# Patient Record
Sex: MALE | Race: BLACK OR AFRICAN AMERICAN | NOT HISPANIC OR LATINO | Employment: FULL TIME | URBAN - METROPOLITAN AREA
[De-identification: names, ages, dates, MRNs, and addresses within clinical notes are randomized per-mention and may not be internally consistent; named-entity substitution may affect disease eponyms.]

---

## 2019-03-11 ENCOUNTER — TRANSFERRED RECORDS (OUTPATIENT)
Dept: HEALTH INFORMATION MANAGEMENT | Facility: CLINIC | Age: 64
End: 2019-03-11

## 2019-04-17 NOTE — TELEPHONE ENCOUNTER
RECORDS RECEIVED FROM: Care Everywhere   DATE RECEIVED:4.22.19   NOTES STATUS DETAILS   OFFICE NOTE from referring provider  N/A    OFFICE NOTE from other cardiologist  Care Everywhere 3.28.19, 3.21.19   DISCHARGE SUMMARY from hospital  Care Everywhere 3.10.19   DISCHARGE REPORT from the ER Care Everywhere 3.23.19, 4.16.19   OPERATIVE REPORT  N/A    MEDICATION LIST Care Everywhere    LABS     BMP Care Everywhere 4.16.19, 4.3.19, 3.11.19, 3.10.19, 2.28.19, 1.4.19   CBC     Care Everywhere 4.16.19, 3.11.19, 3.10.19   CMP N/A    TSH     N/A    Lipids N/A    DIAGNOSTIC PROCEDURES     EKG Care Everywhere 4.16.19, 4.23.19, 10.18.18   Monitor Reports Care Everywhere     N/A    IMAGING (DISC & REPORT)      ECHO  Care Everywhere 3.10.19   Stress Tests Care Everywhere    Cath Care Everywhere    MRI/MRA Care Everywhere    CT/CTA Care Everywhere     Care Everywhere      Action    Action Taken 4/18/19 Sent fax to [facility] for echo on 3/11/19

## 2019-04-22 ENCOUNTER — PRE VISIT (OUTPATIENT)
Dept: CARDIOLOGY | Facility: CLINIC | Age: 64
End: 2019-04-22

## 2019-04-22 ENCOUNTER — OFFICE VISIT (OUTPATIENT)
Dept: CARDIOLOGY | Facility: CLINIC | Age: 64
End: 2019-04-22
Attending: INTERNAL MEDICINE
Payer: COMMERCIAL

## 2019-04-22 VITALS
WEIGHT: 170 LBS | HEART RATE: 64 BPM | OXYGEN SATURATION: 99 % | SYSTOLIC BLOOD PRESSURE: 159 MMHG | BODY MASS INDEX: 24.34 KG/M2 | DIASTOLIC BLOOD PRESSURE: 88 MMHG | HEIGHT: 70 IN

## 2019-04-22 DIAGNOSIS — I21.4 NSTEMI (NON-ST ELEVATED MYOCARDIAL INFARCTION) (H): ICD-10-CM

## 2019-04-22 DIAGNOSIS — R00.2 PALPITATIONS: Primary | ICD-10-CM

## 2019-04-22 PROCEDURE — 99203 OFFICE O/P NEW LOW 30 MIN: CPT | Mod: ZP | Performed by: INTERNAL MEDICINE

## 2019-04-22 PROCEDURE — G0463 HOSPITAL OUTPT CLINIC VISIT: HCPCS

## 2019-04-22 PROCEDURE — 93010 ELECTROCARDIOGRAM REPORT: CPT | Mod: ZP | Performed by: INTERNAL MEDICINE

## 2019-04-22 PROCEDURE — 93005 ELECTROCARDIOGRAM TRACING: CPT | Mod: ZF

## 2019-04-22 RX ORDER — AMLODIPINE BESYLATE 10 MG/1
10 TABLET ORAL AT BEDTIME
Qty: 90 TABLET | Refills: 1 | Status: SHIPPED | OUTPATIENT
Start: 2019-04-22 | End: 2019-10-30

## 2019-04-22 RX ORDER — NEOMYCIN SULFATE, POLYMYXIN B SULFATE, AND DEXAMETHASONE 3.5; 10000; 1 MG/G; [USP'U]/G; MG/G
0.5 OINTMENT OPHTHALMIC
COMMUNITY
Start: 2019-04-17 | End: 2022-03-09

## 2019-04-22 RX ORDER — LISINOPRIL 40 MG/1
40 TABLET ORAL DAILY
Qty: 90 TABLET | Refills: 1 | Status: SHIPPED | OUTPATIENT
Start: 2019-04-22 | End: 2019-10-30

## 2019-04-22 RX ORDER — LISINOPRIL 20 MG/1
40 TABLET ORAL
COMMUNITY
Start: 2019-04-18 | End: 2019-04-22 | Stop reason: DRUGHIGH

## 2019-04-22 RX ORDER — BRIMONIDINE TARTRATE AND TIMOLOL MALEATE 2; 5 MG/ML; MG/ML
1 SOLUTION OPHTHALMIC 2 TIMES DAILY
COMMUNITY
Start: 2019-04-12 | End: 2022-06-28

## 2019-04-22 RX ORDER — CHLORHEXIDINE GLUCONATE ORAL RINSE 1.2 MG/ML
15 SOLUTION DENTAL
COMMUNITY
Start: 2019-01-15

## 2019-04-22 RX ORDER — POTASSIUM CHLORIDE 1500 MG/1
20 TABLET, EXTENDED RELEASE ORAL
COMMUNITY
Start: 2019-04-04 | End: 2020-08-06

## 2019-04-22 RX ORDER — METHAZOLAMIDE 50 MG/1
50 TABLET ORAL
COMMUNITY
Start: 2019-03-27 | End: 2022-03-09

## 2019-04-22 RX ORDER — CLOPIDOGREL BISULFATE 75 MG/1
TABLET ORAL
Refills: 3 | COMMUNITY
Start: 2019-04-02 | End: 2022-03-09

## 2019-04-22 RX ORDER — LATANOPROST 50 UG/ML
1 SOLUTION/ DROPS OPHTHALMIC AT BEDTIME
COMMUNITY
Start: 2019-01-04

## 2019-04-22 RX ORDER — ONDANSETRON 4 MG/1
TABLET, ORALLY DISINTEGRATING ORAL
Refills: 0 | COMMUNITY
Start: 2018-11-20 | End: 2022-03-09

## 2019-04-22 RX ORDER — TADALAFIL 2.5 MG/1
TABLET ORAL
COMMUNITY
Start: 2018-11-27 | End: 2022-03-09

## 2019-04-22 RX ORDER — OFLOXACIN 3 MG/ML
1 SOLUTION/ DROPS OPHTHALMIC
COMMUNITY
Start: 2019-04-17 | End: 2019-05-01

## 2019-04-22 RX ORDER — ASPIRIN 81 MG/1
81 TABLET, CHEWABLE ORAL
COMMUNITY
Start: 2019-03-11

## 2019-04-22 RX ORDER — AMLODIPINE BESYLATE 5 MG/1
TABLET ORAL
Refills: 3 | COMMUNITY
Start: 2019-04-19 | End: 2019-04-22 | Stop reason: DRUGHIGH

## 2019-04-22 RX ORDER — TERAZOSIN 5 MG/1
5 CAPSULE ORAL
COMMUNITY
Start: 2019-04-10 | End: 2019-04-22 | Stop reason: ALTCHOICE

## 2019-04-22 RX ORDER — BRIMONIDINE TARTRATE 2 MG/ML
SOLUTION/ DROPS OPHTHALMIC
Refills: 1 | COMMUNITY
Start: 2018-07-30 | End: 2022-03-09 | Stop reason: ALTCHOICE

## 2019-04-22 RX ORDER — PREDNISOLONE ACETATE 10 MG/ML
1 SUSPENSION/ DROPS OPHTHALMIC
COMMUNITY
Start: 2019-04-17 | End: 2022-03-09

## 2019-04-22 RX ORDER — IBUPROFEN 50 MG/1.25
SUSPENSION, DROPS(FINAL DOSAGE FORM)(ML) ORAL
Refills: 0 | COMMUNITY
Start: 2018-11-02

## 2019-04-22 RX ORDER — ATORVASTATIN CALCIUM 40 MG/1
40 TABLET, FILM COATED ORAL
COMMUNITY
Start: 2019-03-21 | End: 2023-04-13

## 2019-04-22 SDOH — HEALTH STABILITY: MENTAL HEALTH: HOW OFTEN DO YOU HAVE A DRINK CONTAINING ALCOHOL?: NEVER

## 2019-04-22 ASSESSMENT — PAIN SCALES - GENERAL: PAINLEVEL: MILD PAIN (3)

## 2019-04-22 ASSESSMENT — MIFFLIN-ST. JEOR: SCORE: 1567.36

## 2019-04-22 NOTE — NURSING NOTE
Chief Complaint   Patient presents with     New Patient     NSTEMI on March 10th due to 100% occlusion of an OM along with 80% mid to distal RCA disease, HTN     Medications reviewed and vitals/ EKG performed.  Tiana Mario CMA

## 2019-04-22 NOTE — PATIENT INSTRUCTIONS
Patient Instructions:  It was a pleasure to see you in the cardiology clinic today.      If you have any questions, you can reach my nurse, Estela Cruz LPN, at [phone].  Press Option #1 for the [facility], and then press Option #3 for nursing.    We are encouraging the use of [employer] to communicate with your HealthCare Provider    Medication Changes:   - Stop Terazosin  - Increase Amlodipine to 10 mg at bedtime.  - Increase Lisinopril to 40 mg every day.    Recommendations: Complete fasting labs.    Studies Ordered: None.    The results from today include: EKG.    Please follow up: With Dr. Fernandes in 3 months.    Sincerely,    Duncan Fernandes MD     If you have an urgent need after hours (8:00 am to 4:30 pm) please call [phone] and ask for the cardiology fellow on call.    [employer]  [email or web address]  Access Code: [email or web address]

## 2019-04-22 NOTE — LETTER
4/22/2019      RE: Delonte Tijerina  [address]  [facility] [address]       Dear Colleague,    Thank you for the opportunity to participate in the care of your patient, Delonte Tijerina, at the [facility] HEART [facility]. Please see a copy of my visit note below.    HPI:     I had the privilege to evaluate and examine Ms. Delonte Tijerina who is a 63-year old male patient with following Hx of a NSTEMI,  CCS class IV, who underwent a coronary angiogram in [facility] 3/11/19 and consequently a coronary thrombectomy, and PCI + stenting - for details - see procedures.  Patient is now in cardiac rehab and the aim of his patient visit today is arteria hypertension - which he has several years and is difficult to control.    Patient denies chest pain, shortness of breath, palpitations and intermittent claudication.      Primary open angle glaucoma of left eye, moderate stage 04/17/2019   Overview:     Added automatically from request for surgery [id number]     Primary open angle glaucoma of right eye, mild stage 04/17/2019   Overview:     Added automatically from request for surgery [id number]     Coronary artery disease involving native coronary artery of native heart without angina pectoris 03/21/2019   Pure hypercholesterolemia 03/11/2019   Sinus bradycardia 03/11/2019   NSTEMI (non-ST elevated myocardial infarction) 03/10/2019   Exertional angina 03/10/2019   Other insomnia 03/10/2019   CKD (chronic kidney disease) stage 3, GFR 30-59 ml/min 03/10/2019   Primary open-angle glaucoma, left eye, moderate stage 10/30/2018   Overview:     Added automatically from request for surgery [id number]     Primary open angle glaucoma 06/23/2014   Overview:     Primary open-angle glaucoma(365.11)     Erectile dysfunction of organic origin 02/16/2012   Essential hypertension 02/18/2008   Overview:     Hypertension     Benign neoplasm of colon 12/04/2007   Overview:     LW Modifier:  repeat colonoscopy 11/2012  LW Onset: [id number]  ; Polyp Colon Adenomatous           PAST MEDICAL HISTORY:  See above summary under HPI    CURRENT MEDICATIONS:  Current Outpatient Medications   Medication Sig Dispense Refill     amLODIPine (NORVASC) 10 MG tablet Take 1 tablet (10 mg) by mouth At Bedtime 90 tablet 1     aspirin (ASA) 81 MG chewable tablet Take 81 mg by mouth       atorvastatin (LIPITOR) 40 MG tablet Take 40 mg by mouth       brimonidine (ALPHAGAN) 0.2 % ophthalmic solution   1     brimonidine-timolol (COMBIGAN) 0.2-0.5 % ophthalmic solution 1 drop       chlorhexidine (PERIDEX) 0.12 % solution 15 mLs       clopidogrel (PLAVIX) 75 MG tablet   3     diclofenac (VOLTAREN) 1 % topical gel Place 2 g onto the skin       EQL SENNA LAXATIVE 8.6 MG tablet   0     latanoprost (XALATAN) 0.005 % ophthalmic solution 1 drop       lisinopril (PRINIVIL/ZESTRIL) 40 MG tablet Take 1 tablet (40 mg) by mouth daily 90 tablet 1     methazolamide (NEPTAZANE) 50 MG tablet Take 50 mg by mouth       neomycin-polymyxin-dexamethasone (MAXITROL) 3.5-41133-6.1 ophthalmic ointment 0.5 inches       ofloxacin (OCUFLOX) 0.3 % ophthalmic solution 1 drop       ondansetron (ZOFRAN-ODT) 4 MG ODT tab   0     potassium chloride ER (K-DUR/KLOR-CON M) 20 MEQ CR tablet Take 20 mEq by mouth       prednisoLONE acetate (PRED FORTE) 1 % ophthalmic suspension 1 drop every 2 hours       tadalafil (CIALIS) 2.5 MG tablet TAKE 1 TABLET DAILY         PAST SURGICAL HISTORY:  See under HPI  ALLERGIES     No Known Allergies    FAMILY HISTORY:  ACHILLES TENDON SURGERY         TONSILLECTOMY         SHOULDER SURGERY         SHOULDER SURGERY 11/02/2018 Left subacromail decompression, distal clavicle excision       SOCIAL HISTORY:  Social History     Socioeconomic History     Marital status:      Spouse name: None     Number of children: None     Years of education: None     Highest education level: None   Occupational History     None   Social Needs      "Financial resource strain: None     Food insecurity:     Worry: None     Inability: None     Transportation needs:     Medical: None     Non-medical: None   Tobacco Use     Smoking status: Never Smoker     Smokeless tobacco: Never Used   Substance and Sexual Activity     Alcohol use: Never     Frequency: Never     Drug use: Never     Sexual activity: None   Lifestyle     Physical activity:     Days per week: None     Minutes per session: None     Stress: None   Relationships     Social connections:     Talks on phone: None     Gets together: None     Attends Spiritism service: None     Active member of club or organization: None     Attends meetings of clubs or organizations: None     Relationship status: None     Intimate partner violence:     Fear of current or ex partner: None     Emotionally abused: None     Physically abused: None     Forced sexual activity: None   Other Topics Concern     None   Social History Narrative     None       ROS:   Constitutional: No fever, chills, or sweats. No weight gain/loss   ENT: No visual disturbance, ear ache, epistaxis, sore throat  Allergies/Immunologic: Negative.   Respiratory: No cough, hemoptysia  Cardiovascular: As per HPI  GI: No nausea, vomiting, hematemesis, melena, or hematochezia  : No urinary frequency, dysuria, or hematuria  Integument: Negative  Psychiatric: Negative  Neuro: Negative  Endocrinology: Negative   Musculoskeletal: Negative    EXAM:  /88 (BP Location: Right arm, Patient Position: Chair, Cuff Size: Adult Regular)   Pulse 64   Ht 1.778 m (5' 10\")   Wt 77.1 kg (170 lb)   SpO2 99%   BMI 24.39 kg/m     In general, the patient is a pleasant male in no apparent distress.    HEENT: NC/AT.  PERRLA.  EOMI.  Sclerae white, not injected.  Nares clear.  Pharynx without erythema or exudate.  Dentition intact.    Neck: No adenopathy.  No thyromegaly. Carotids +4/4 bilaterally without bruits.  No jugular venous distension.   Heart: RRR. Normal S1, S2 " splits physiologically. No murmur, rub, click, or gallop. The PMI is in the 5th ICS in the midclavicular line. There is no heave.    Lungs: CTA.  No ronchi, wheezes, rales.  No dullness to percussion.   Abdomen: Soft, nontender, nondistended. No organomegaly.  No bruits.   Extremities: No clubbing, cyanosis, or edema.  The pulses are +4/4 at the radial, brachial, femoral, popliteal, DP, and PT sites bilaterally.  No bruits are noted.  Neurologic: Alert and oriented to person/place/time, normal speech, gait and affect  Skin: No petechiae, purpura or rash.    Labs:    Lab results from [facility] from  last admission in March 2019 were reviewed with patient (see Care everywhere)     Procedures:  EKG 4/22/19: sin bradycardia (59bpm) - inverted T-wave V3-V6          Cardiovascular Catheterization Comprehensive Report      03/11/2019      History and indications    INDICATIONS   --  Angina/MI: myocardial infarction without    ST elevation (NSTEMI), CCS class IV. The patient was    stable.        Procedures    PROCEDURES PERFORMED:        --  Right coronary angiography.    --  Left coronary angiography.    --  Coronary Thrombectomy.    --  Cor. Stent, Single Vessel.    --  Cor. Stent, Additional Ves.    --  Intervention on OM1: drug-eluting stent.    --  Intervention on mid RCA: drug-eluting stent.    NARRATIVE: The risks and alternatives of the procedures and    conscious sedation were explained to the patient and    informed consent was obtained. The patient was brought to    the cath lab and placed on the table. The planned puncture    sites were prepped and draped in the usual sterile fashion.        --  Right radial artery access.        --  Right coronary artery angiography. The vessel was    selectively cannulated and angiography was performed.        --  Left coronary artery angiography. The vessel was    selectively cannulated and angiography was performed.        Coronary angiography        CORONARY  CIRCULATION: The coronary circulation is right    dominant.    Left main coronary artery    Left main: Normal.    Left anterior descending artery and branches    LAD: Angiography showed minor luminal irregularities. Mid    LAD: There was a discrete 20 % stenosis at a site with no    prior intervention.    Left circumflex artery and branches    Circumflex: Angiography showed minor luminal irregularities.    1st obtuse marginal: There was a tubular 100 % stenosis at    a site with no prior intervention. The lesion was    associated with a large filling defect consistent with    thrombus. There was EDILSON grade 0 flow through the vessel    (no flow). An intervention was performed.    Right coronary artery and branches    Proximal RCA: There was a tubular 40 % stenosis at a site    with no prior intervention. Mid RCA: Distal RCA: There was    a discrete 80 % stenosis at a site with no prior    intervention. The lesion was without evidence of thrombus.    An intervention was performed.        Interventions        LESION #1 INTERVENTION    A drug-eluting stent was performed on the lesion in the 1st    obtuse marginal. There was no dissection.        --  Vessel setup was performed. A CLS3 guiding catheter was    used to intubate the vessel.        --  Vessel setup was performed. A [employer] RC wire was used    to cross the lesion.        --  Mechanical ( [employer] AC) thrombectomy was    performed, with max duration 24 sec, max volume out 10 ml,    and 1 attempt(s).        --  Balloon angioplasty was performed, using a 2.75 x 12 mm    Emerge MR balloon, with 1 inflations and a maximum    inflation pressure of 12 alberto.        --  A 3.0 x 24mm Synergy everolimus-eluting stent was placed    across the lesion and deployed at a maximum inflation    pressure of 11 alberto.        --  Balloon angioplasty was performed, using a 3.25 x 12 mm    NC Emerge balloon, with 2 inflations and a maximum    inflation pressure of 14 alberto.         LESION #2 INTERVENTION A drug-eluting stent was performed on    the lesion in the mid RCA. There was no dissection.        A 4.0 x 20mm Synergy everolimus-eluting stent was placed    across the lesion and deployed at a maximum inflation    pressure of 11 alberto.        Balloon angioplasty was performed, using a 4.5 x 12 mm NC    Emerge balloon, with 2 inflations and a maximum inflation    pressure of 12 alberto.        CARDIAC INTERVENTIONS    Coronary Thrombectomy.        Cor. Stent, Single Vessel.        Cor. Stent, Additional Ves.          Assessment and Plan:     We discussed the results with the patient.  The discussed extensively the importance of a heart healthy diet, lifestyle as well as salt reduction in the food.   We advised to continue with cardiac rehab.  We discussed the lab results during hospitalization and follow-up in the [facility] for his NSTEMI March 11 this year.  We made following medication changes regarding his antihypertensive medication:     - Stop Terazosin  - Increase Amlodipine to 10 mg at bedtime.  - Increase Lisinopril to 40 mg every day.     Recommendations: Complete fasting labs.     The results from today include: EKG.    We asked patient also register his BP measured during cardiac rehab and later start with home BP measurements.     Please follow up: With Dr. Escudero in 3 months.    Kae Escudero MD, PhD  Professor of Medicine  Division of Cardiology             CC  Patient Care Team:  Abran Tapia MD as PCP - General (Family Practice)  KAE ESCUDERO    Please do not hesitate to contact me if you have any questions/concerns.     Sincerely,     Kae Escudero MD

## 2019-04-23 LAB — INTERPRETATION ECG - MUSE: NORMAL

## 2019-04-30 NOTE — PROGRESS NOTES
HPI:     I had the privilege to evaluate and examine Ms. Delonte Tijerina who is a 63-year old male patient with following Hx of a NSTEMI,  CCS class IV, who underwent a coronary angiogram in [facility] 3/11/19 and consequently a coronary thrombectomy, and PCI + stenting - for details - see procedures.  Patient is now in cardiac rehab and the aim of his patient visit today is arteria hypertension - which he has several years and is difficult to control.    Patient denies chest pain, shortness of breath, palpitations and intermittent claudication.      Primary open angle glaucoma of left eye, moderate stage 04/17/2019   Overview:     Added automatically from request for surgery [id number]     Primary open angle glaucoma of right eye, mild stage 04/17/2019   Overview:     Added automatically from request for surgery [id number]     Coronary artery disease involving native coronary artery of native heart without angina pectoris 03/21/2019   Pure hypercholesterolemia 03/11/2019   Sinus bradycardia 03/11/2019   NSTEMI (non-ST elevated myocardial infarction) 03/10/2019   Exertional angina 03/10/2019   Other insomnia 03/10/2019   CKD (chronic kidney disease) stage 3, GFR 30-59 ml/min 03/10/2019   Primary open-angle glaucoma, left eye, moderate stage 10/30/2018   Overview:     Added automatically from request for surgery [id number]     Primary open angle glaucoma 06/23/2014   Overview:     Primary open-angle glaucoma(365.11)     Erectile dysfunction of organic origin 02/16/2012   Essential hypertension 02/18/2008   Overview:     Hypertension     Benign neoplasm of colon 12/04/2007   Overview:     LW Modifier: repeat colonoscopy 11/2012  LW Onset: 27Nov2007  ; Polyp Colon Adenomatous           PAST MEDICAL HISTORY:  See above summary under HPI    CURRENT MEDICATIONS:  Current Outpatient Medications   Medication Sig Dispense Refill     amLODIPine (NORVASC) 10 MG tablet Take 1 tablet (10 mg) by mouth At Bedtime 90 tablet 1      aspirin (ASA) 81 MG chewable tablet Take 81 mg by mouth       atorvastatin (LIPITOR) 40 MG tablet Take 40 mg by mouth       brimonidine (ALPHAGAN) 0.2 % ophthalmic solution   1     brimonidine-timolol (COMBIGAN) 0.2-0.5 % ophthalmic solution 1 drop       chlorhexidine (PERIDEX) 0.12 % solution 15 mLs       clopidogrel (PLAVIX) 75 MG tablet   3     diclofenac (VOLTAREN) 1 % topical gel Place 2 g onto the skin       EQL SENNA LAXATIVE 8.6 MG tablet   0     latanoprost (XALATAN) 0.005 % ophthalmic solution 1 drop       lisinopril (PRINIVIL/ZESTRIL) 40 MG tablet Take 1 tablet (40 mg) by mouth daily 90 tablet 1     methazolamide (NEPTAZANE) 50 MG tablet Take 50 mg by mouth       neomycin-polymyxin-dexamethasone (MAXITROL) 3.5-50037-0.1 ophthalmic ointment 0.5 inches       ofloxacin (OCUFLOX) 0.3 % ophthalmic solution 1 drop       ondansetron (ZOFRAN-ODT) 4 MG ODT tab   0     potassium chloride ER (K-DUR/KLOR-CON M) 20 MEQ CR tablet Take 20 mEq by mouth       prednisoLONE acetate (PRED FORTE) 1 % ophthalmic suspension 1 drop every 2 hours       tadalafil (CIALIS) 2.5 MG tablet TAKE 1 TABLET DAILY         PAST SURGICAL HISTORY:  See under HPI  ALLERGIES     No Known Allergies    FAMILY HISTORY:  ACHILLES TENDON SURGERY         TONSILLECTOMY         SHOULDER SURGERY         SHOULDER SURGERY 11/02/2018 Left subacromail decompression, distal clavicle excision       SOCIAL HISTORY:  Social History     Socioeconomic History     Marital status:      Spouse name: None     Number of children: None     Years of education: None     Highest education level: None   Occupational History     None   Social Needs     Financial resource strain: None     Food insecurity:     Worry: None     Inability: None     Transportation needs:     Medical: None     Non-medical: None   Tobacco Use     Smoking status: Never Smoker     Smokeless tobacco: Never Used   Substance and Sexual Activity     Alcohol use: Never     Frequency: Never     Drug  "use: Never     Sexual activity: None   Lifestyle     Physical activity:     Days per week: None     Minutes per session: None     Stress: None   Relationships     Social connections:     Talks on phone: None     Gets together: None     Attends Nondenominational service: None     Active member of club or organization: None     Attends meetings of clubs or organizations: None     Relationship status: None     Intimate partner violence:     Fear of current or ex partner: None     Emotionally abused: None     Physically abused: None     Forced sexual activity: None   Other Topics Concern     None   Social History Narrative     None       ROS:   Constitutional: No fever, chills, or sweats. No weight gain/loss   ENT: No visual disturbance, ear ache, epistaxis, sore throat  Allergies/Immunologic: Negative.   Respiratory: No cough, hemoptysia  Cardiovascular: As per HPI  GI: No nausea, vomiting, hematemesis, melena, or hematochezia  : No urinary frequency, dysuria, or hematuria  Integument: Negative  Psychiatric: Negative  Neuro: Negative  Endocrinology: Negative   Musculoskeletal: Negative    EXAM:  /88 (BP Location: Right arm, Patient Position: Chair, Cuff Size: Adult Regular)   Pulse 64   Ht 1.778 m (5' 10\")   Wt 77.1 kg (170 lb)   SpO2 99%   BMI 24.39 kg/m    In general, the patient is a pleasant male in no apparent distress.    HEENT: NC/AT.  PERRLA.  EOMI.  Sclerae white, not injected.  Nares clear.  Pharynx without erythema or exudate.  Dentition intact.    Neck: No adenopathy.  No thyromegaly. Carotids +4/4 bilaterally without bruits.  No jugular venous distension.   Heart: RRR. Normal S1, S2 splits physiologically. No murmur, rub, click, or gallop. The PMI is in the 5th ICS in the midclavicular line. There is no heave.    Lungs: CTA.  No ronchi, wheezes, rales.  No dullness to percussion.   Abdomen: Soft, nontender, nondistended. No organomegaly.  No bruits.   Extremities: No clubbing, cyanosis, or edema.  The " pulses are +4/4 at the radial, brachial, femoral, popliteal, DP, and PT sites bilaterally.  No bruits are noted.  Neurologic: Alert and oriented to person/place/time, normal speech, gait and affect  Skin: No petechiae, purpura or rash.    Labs:    Lab results from [facility] from  last admission in March 2019 were reviewed with patient (see Care everywhere)     Procedures:  EKG 4/22/19: sin bradycardia (59bpm) - inverted T-wave V3-V6          Cardiovascular Catheterization Comprehensive Report      03/11/2019      History and indications    INDICATIONS   --  Angina/MI: myocardial infarction without    ST elevation (NSTEMI), CCS class IV. The patient was    stable.        Procedures    PROCEDURES PERFORMED:        --  Right coronary angiography.    --  Left coronary angiography.    --  Coronary Thrombectomy.    --  Cor. Stent, Single Vessel.    --  Cor. Stent, Additional Ves.    --  Intervention on OM1: drug-eluting stent.    --  Intervention on mid RCA: drug-eluting stent.    NARRATIVE: The risks and alternatives of the procedures and    conscious sedation were explained to the patient and    informed consent was obtained. The patient was brought to    the cath lab and placed on the table. The planned puncture    sites were prepped and draped in the usual sterile fashion.        --  Right radial artery access.        --  Right coronary artery angiography. The vessel was    selectively cannulated and angiography was performed.        --  Left coronary artery angiography. The vessel was    selectively cannulated and angiography was performed.        Coronary angiography        CORONARY CIRCULATION: The coronary circulation is right    dominant.    Left main coronary artery    Left main: Normal.    Left anterior descending artery and branches    LAD: Angiography showed minor luminal irregularities. Mid    LAD: There was a discrete 20 % stenosis at a site with no    prior intervention.    Left circumflex artery  and branches    Circumflex: Angiography showed minor luminal irregularities.    1st obtuse marginal: There was a tubular 100 % stenosis at    a site with no prior intervention. The lesion was    associated with a large filling defect consistent with    thrombus. There was EDILSON grade 0 flow through the vessel    (no flow). An intervention was performed.    Right coronary artery and branches    Proximal RCA: There was a tubular 40 % stenosis at a site    with no prior intervention. Mid RCA: Distal RCA: There was    a discrete 80 % stenosis at a site with no prior    intervention. The lesion was without evidence of thrombus.    An intervention was performed.        Interventions        LESION #1 INTERVENTION    A drug-eluting stent was performed on the lesion in the 1st    obtuse marginal. There was no dissection.        --  Vessel setup was performed. A CLS3 guiding catheter was    used to intubate the vessel.        --  Vessel setup was performed. A AddMyBest RC wire was used    to cross the lesion.        --  Mechanical ( Priority One AC) thrombectomy was    performed, with max duration 24 sec, max volume out 10 ml,    and 1 attempt(s).        --  Balloon angioplasty was performed, using a 2.75 x 12 mm    Emerge MR balloon, with 1 inflations and a maximum    inflation pressure of 12 alberto.        --  A 3.0 x 24mm Synergy everolimus-eluting stent was placed    across the lesion and deployed at a maximum inflation    pressure of 11 alberto.        --  Balloon angioplasty was performed, using a 3.25 x 12 mm    NC Emerge balloon, with 2 inflations and a maximum    inflation pressure of 14 alberto.        LESION #2 INTERVENTION A drug-eluting stent was performed on    the lesion in the mid RCA. There was no dissection.        A 4.0 x 20mm Synergy everolimus-eluting stent was placed    across the lesion and deployed at a maximum inflation    pressure of 11 alberto.        Balloon angioplasty was performed, using a 4.5 x 12 mm NC     Emerge balloon, with 2 inflations and a maximum inflation    pressure of 12 alberto.        CARDIAC INTERVENTIONS    Coronary Thrombectomy.        Cor. Stent, Single Vessel.        Cor. Stent, Additional Ves.          Assessment and Plan:     We discussed the results with the patient.  The discussed extensively the importance of a heart healthy diet, lifestyle as well as salt reduction in the food.   We advised to continue with cardiac rehab.  We discussed the lab results during hospitalization and [facility] for his NSTEMI March 11 this year.  We made following medication changes regarding his antihypertensive medication:     - Stop Terazosin  - Increase Amlodipine to 10 mg at bedtime.  - Increase Lisinopril to 40 mg every day.     Recommendations: Complete fasting labs.     The results from today include: EKG.    We asked patient also register his BP measured during cardiac rehab and later start with home BP measurements.     Please follow up: With Dr. Escudero in 3 months.    Kae Escudero MD, PhD  Professor of Medicine  Division of Cardiology             CC  Patient Care Team:  Abran Tapia MD as PCP - General (Family Practice)  KAE ESCUDERO

## 2019-09-30 ENCOUNTER — HEALTH MAINTENANCE LETTER (OUTPATIENT)
Age: 64
End: 2019-09-30

## 2019-10-30 DIAGNOSIS — I21.4 NSTEMI (NON-ST ELEVATED MYOCARDIAL INFARCTION) (H): ICD-10-CM

## 2019-11-01 RX ORDER — LISINOPRIL 40 MG/1
40 TABLET ORAL DAILY
Qty: 90 TABLET | Refills: 0 | Status: SHIPPED | OUTPATIENT
Start: 2019-11-01 | End: 2020-01-20

## 2019-11-01 RX ORDER — AMLODIPINE BESYLATE 10 MG/1
10 TABLET ORAL AT BEDTIME
Qty: 90 TABLET | Refills: 0 | Status: SHIPPED | OUTPATIENT
Start: 2019-11-01 | End: 2020-01-20

## 2020-01-19 DIAGNOSIS — I21.4 NSTEMI (NON-ST ELEVATED MYOCARDIAL INFARCTION) (H): ICD-10-CM

## 2020-01-20 NOTE — TELEPHONE ENCOUNTER
Lisinopril Oral Tablet 40 MG     Last Written Prescription Date:  11/1/2019  Last Fill Quantity: 90,   # refills: 0  Last Office Visit : 4/22/2019  Future Office visit:  None  Routing refill request to provider for review/approval because:  Follow up appointment??  On 4/22/2019 Pt due for follow up visit for Pt care.  Continue same dose?? Change dose??   Change Med??     Referring to Provider for review      amLODIPine Besylate Oral Tablet 10 MG    Last Written Prescription Date:  11/1/2019  Last Fill Quantity: 90,   # refills: 0  Last Office Visit : 4/22/2019  Future Office visit:  None  Routing refill request to provider for review/approval because:  Follow up appointment??    On 4/22/2019 Pt due for follow up visit for Pt care.  Continue same dose?? Change dose??   Change Med??     Referring to Provider for review  Instructions     Patient Instructions:  It was a pleasure to see you in the cardiology clinic today.        If you have any questions, you can reach my nurse, Estela Cruz LPN, at [phone].  Press Option #1 for the [facility], and then press Option #3 for nursing.     We are encouraging the use of [employer] to communicate with your HealthCare Provider     Medication Changes:   - Stop Terazosin  - Increase Amlodipine to 10 mg at bedtime.  - Increase Lisinopril to 40 mg every day.     Recommendations: Complete fasting labs.     Studies Ordered: None.     The results from today include: EKG.     Please follow up: With Dr. Fernandes in 3 months.     Sincerely,     Duncan Fernandes MD 4/22/2019     If you have an urgent need after hours (8:00 am to 4:30 pm) please call [phone] and ask for the cardiology fellow on call.

## 2020-01-24 RX ORDER — LISINOPRIL 40 MG/1
40 TABLET ORAL DAILY
Qty: 30 TABLET | Refills: 0 | Status: SHIPPED | OUTPATIENT
Start: 2020-01-24 | End: 2020-03-01

## 2020-01-24 RX ORDER — AMLODIPINE BESYLATE 10 MG/1
10 TABLET ORAL AT BEDTIME
Qty: 30 TABLET | Refills: 0 | Status: SHIPPED | OUTPATIENT
Start: 2020-01-24 | End: 2020-03-03

## 2020-02-16 DIAGNOSIS — I21.4 NSTEMI (NON-ST ELEVATED MYOCARDIAL INFARCTION) (H): ICD-10-CM

## 2020-02-18 NOTE — TELEPHONE ENCOUNTER
amLODIPine Besylate Oral Tablet 10 MG     Last Written Prescription Date:  1/24/20  Last Fill Quantity: 30,   # refills: 0       Lisinopril Oral Tablet 40 MG  Last Written Prescription Date:  1/24/20  Last Fill Quantity: 30,   # refills: 0  Last Office Visit : 4/22/2019  Future Office visit:  None( recommended 3 months)    Routing refill request to provider for review/approval because: NSTEMI at [facility] March 2019.  Seen at [facility] x 1.  Last labs: per Care Everywhere on 4/16/2019,  K=3.9  Cr =1.19 ( abnormal)   No follow up appt.   90 day and 30 day kaiden refills given. Pended x 14 days.    Scheduling has been notified to contact the pt for appointment.

## 2020-02-19 RX ORDER — LISINOPRIL 40 MG/1
40 TABLET ORAL DAILY
Qty: 14 TABLET | Refills: 0 | OUTPATIENT
Start: 2020-02-19

## 2020-02-19 RX ORDER — AMLODIPINE BESYLATE 10 MG/1
10 TABLET ORAL AT BEDTIME
Qty: 14 TABLET | Refills: 0 | OUTPATIENT
Start: 2020-02-19

## 2020-02-19 NOTE — TELEPHONE ENCOUNTER
Refill for Lisinopril and Amlodipine has been refused as Pt is overdue for F/U.  Lisette refill provided.    Estela Cruz LPN

## 2020-02-28 DIAGNOSIS — I21.4 NSTEMI (NON-ST ELEVATED MYOCARDIAL INFARCTION) (H): ICD-10-CM

## 2020-03-01 DIAGNOSIS — I21.4 NSTEMI (NON-ST ELEVATED MYOCARDIAL INFARCTION) (H): ICD-10-CM

## 2020-03-01 NOTE — TELEPHONE ENCOUNTER
"   lisinopril (PRINIVIL/ZESTRIL) 40 MG tablet  Last Written Prescription Date:  1/24/20  Last Fill Quantity:30   # refills: 0  Last Office Visit : 4/22/19  Future Office visit:  None    \" With Dr. Fernandes in 3 months\"    Scheduling has been notified to contact the pt for appointment.      Routing refill request to provider for review/approval because: past due for 3 mth appt. rf or refuse?        "

## 2020-03-03 ENCOUNTER — TELEPHONE (OUTPATIENT)
Dept: CARDIOLOGY | Facility: CLINIC | Age: 65
End: 2020-03-03

## 2020-03-03 DIAGNOSIS — I21.4 NSTEMI (NON-ST ELEVATED MYOCARDIAL INFARCTION) (H): ICD-10-CM

## 2020-03-03 RX ORDER — AMLODIPINE BESYLATE 10 MG/1
10 TABLET ORAL AT BEDTIME
Qty: 30 TABLET | Refills: 0 | Status: SHIPPED | OUTPATIENT
Start: 2020-03-03 | End: 2020-03-05

## 2020-03-03 RX ORDER — LISINOPRIL 40 MG/1
40 TABLET ORAL DAILY
Qty: 90 TABLET | Refills: 0 | Status: SHIPPED | OUTPATIENT
Start: 2020-03-03 | End: 2020-03-05

## 2020-03-03 NOTE — TELEPHONE ENCOUNTER
[facility] Call Center    Phone Message    May a detailed message be left on voicemail: yes     Reason for Call: Medication Refill Request    Has the patient contacted the pharmacy for the refill? Yes   Name of medication being requested: amLODIPine (NORVASC) 10 MG tablet and lisinopril (PRINIVIL/ZESTRIL) 40 MG tablet  Provider who prescribed the medication: Dr. Fernandes  Pharmacy: [facility] [address] [place] [address]  Date medication is needed: ASAP   Pt is scheduled for the first available visit with Dr. Fernandes on 06/04/2020. Pt states he is currently completely out of medication and is needing his refills ASAP.       Action Taken: Message routed to:  [facility] Cardio    Travel Screening: Not Applicable

## 2020-03-03 NOTE — TELEPHONE ENCOUNTER
Last Clinic Visit: 4/22/19   NV:  6/4/20   30 DAY RFs   UNTIL APPT  Card FYI  : BP > 140/90   30 DAY RF OVER DUE RTC

## 2020-03-05 RX ORDER — LISINOPRIL 40 MG/1
40 TABLET ORAL DAILY
Qty: 90 TABLET | Refills: 0 | Status: SHIPPED | OUTPATIENT
Start: 2020-03-05 | End: 2020-08-06

## 2020-03-05 RX ORDER — AMLODIPINE BESYLATE 10 MG/1
10 TABLET ORAL AT BEDTIME
Qty: 90 TABLET | Refills: 0 | Status: SHIPPED | OUTPATIENT
Start: 2020-03-05 | End: 2020-07-03

## 2020-03-15 ENCOUNTER — HEALTH MAINTENANCE LETTER (OUTPATIENT)
Age: 65
End: 2020-03-15

## 2020-04-27 ENCOUNTER — TELEPHONE (OUTPATIENT)
Dept: OPHTHALMOLOGY | Facility: CLINIC | Age: 65
End: 2020-04-27

## 2020-04-28 NOTE — TELEPHONE ENCOUNTER
FUTURE VISIT INFORMATION      FUTURE VISIT INFORMATION:    Date: 6/9/20    Time: 10:00am    [place] [facility]  [place]:    Referring provider:  self    Referring providers clinic:  N/A    Reason for visit/diagnosis   Reconstructive - Droopy Eyelid    RECORDS REQUESTED FROM:       Clinic name Comments Records Status Imaging Status   [facility] OV/notes 10/9/2006-4/19/19 Epic/Care Everywhere

## 2020-05-18 ENCOUNTER — DOCUMENTATION ONLY (OUTPATIENT)
Dept: CARE COORDINATION | Facility: CLINIC | Age: 65
End: 2020-05-18

## 2020-06-09 ENCOUNTER — PRE VISIT (OUTPATIENT)
Dept: OPHTHALMOLOGY | Facility: CLINIC | Age: 65
End: 2020-06-09

## 2020-07-02 DIAGNOSIS — I21.4 NSTEMI (NON-ST ELEVATED MYOCARDIAL INFARCTION) (H): ICD-10-CM

## 2020-07-03 NOTE — TELEPHONE ENCOUNTER
amLODIPine Besylate Oral Tablet 10 MG       Last Written Prescription Date:  3-5-20  Last Fill Quantity: 90,   # refills: 0  Last Office Visit : 4-22-19 ( RTC 3 M)  Future Office visit:  8-6-20    Routing refill request to provider for review/approval because:  Previous kaiden RF given.  Previous cancel appt  ? RF    FYI: overdue lab: Cr

## 2020-07-07 RX ORDER — AMLODIPINE BESYLATE 10 MG/1
10 TABLET ORAL AT BEDTIME
Qty: 90 TABLET | Refills: 0 | Status: SHIPPED | OUTPATIENT
Start: 2020-07-07 | End: 2020-08-06

## 2020-07-07 NOTE — TELEPHONE ENCOUNTER
[facility] Call Center    Phone Message    May a detailed message be left on voicemail: yes     Reason for Call: Other: . pt states he is completely out of this med. It appears we received this request on 6:45am 7/2 which makes this beyond 72 business hour turnaround time.    Action Taken: Message routed to:  [facility] heart    Travel Screening: Not Applicable

## 2020-07-07 NOTE — TELEPHONE ENCOUNTER
Called and left Pt VM informing him amlodipine prescription was sent to his preferred pharmacy.    Estela Cruz LPN

## 2020-08-06 ENCOUNTER — VIRTUAL VISIT (OUTPATIENT)
Dept: CARDIOLOGY | Facility: CLINIC | Age: 65
End: 2020-08-06
Payer: COMMERCIAL

## 2020-08-06 DIAGNOSIS — I21.4 NSTEMI (NON-ST ELEVATED MYOCARDIAL INFARCTION) (H): Primary | ICD-10-CM

## 2020-08-06 DIAGNOSIS — I21.4 NSTEMI (NON-ST ELEVATED MYOCARDIAL INFARCTION) (H): ICD-10-CM

## 2020-08-06 DIAGNOSIS — Z13.220 LIPID SCREENING: ICD-10-CM

## 2020-08-06 LAB
ALBUMIN SERPL-MCNC: 3.6 G/DL (ref 3.4–5)
ALP SERPL-CCNC: 168 U/L (ref 40–150)
ALT SERPL W P-5'-P-CCNC: 43 U/L (ref 0–70)
ANION GAP SERPL CALCULATED.3IONS-SCNC: 4 MMOL/L (ref 3–14)
AST SERPL W P-5'-P-CCNC: 31 U/L (ref 0–45)
BILIRUB SERPL-MCNC: 0.6 MG/DL (ref 0.2–1.3)
BUN SERPL-MCNC: 17 MG/DL (ref 7–30)
CALCIUM SERPL-MCNC: 8.6 MG/DL (ref 8.5–10.1)
CHLORIDE SERPL-SCNC: 106 MMOL/L (ref 94–109)
CHOLEST SERPL-MCNC: 98 MG/DL
CO2 SERPL-SCNC: 32 MMOL/L (ref 20–32)
CREAT SERPL-MCNC: 1.37 MG/DL (ref 0.66–1.25)
GFR SERPL CREATININE-BSD FRML MDRD: 53 ML/MIN/{1.73_M2}
GLUCOSE SERPL-MCNC: 107 MG/DL (ref 70–99)
HDLC SERPL-MCNC: 40 MG/DL
LDLC SERPL CALC-MCNC: 45 MG/DL
NONHDLC SERPL-MCNC: 57 MG/DL
POTASSIUM SERPL-SCNC: 3.4 MMOL/L (ref 3.4–5.3)
PROT SERPL-MCNC: 7.4 G/DL (ref 6.8–8.8)
SODIUM SERPL-SCNC: 142 MMOL/L (ref 133–144)
TRIGL SERPL-MCNC: 61 MG/DL

## 2020-08-06 PROCEDURE — 40001009 ZZH VIDEO/TELEPHONE VISIT; NO CHARGE

## 2020-08-06 PROCEDURE — 99214 OFFICE O/P EST MOD 30 MIN: CPT | Mod: 95 | Performed by: INTERNAL MEDICINE

## 2020-08-06 PROCEDURE — 80061 LIPID PANEL: CPT | Performed by: INTERNAL MEDICINE

## 2020-08-06 PROCEDURE — 80053 COMPREHEN METABOLIC PANEL: CPT | Performed by: INTERNAL MEDICINE

## 2020-08-06 PROCEDURE — 36415 COLL VENOUS BLD VENIPUNCTURE: CPT | Performed by: INTERNAL MEDICINE

## 2020-08-06 RX ORDER — ESCITALOPRAM OXALATE 10 MG/1
10 TABLET ORAL DAILY
COMMUNITY
End: 2022-03-09

## 2020-08-06 RX ORDER — AMLODIPINE BESYLATE 10 MG/1
10 TABLET ORAL AT BEDTIME
Qty: 90 TABLET | Refills: 3 | Status: SHIPPED | OUTPATIENT
Start: 2020-08-06 | End: 2021-08-30

## 2020-08-06 RX ORDER — LISINOPRIL 40 MG/1
40 TABLET ORAL DAILY
Qty: 90 TABLET | Refills: 3 | Status: SHIPPED | OUTPATIENT
Start: 2020-08-06

## 2020-08-06 RX ORDER — CARVEDILOL 6.25 MG/1
6.25 TABLET ORAL 2 TIMES DAILY WITH MEALS
COMMUNITY

## 2020-08-06 NOTE — PROGRESS NOTES
"Deolnte Tijerina is a 64 year old male who is being evaluated via a billable telephone visit.      The patient has been notified of following:     \"This telephone visit will be conducted via a call between you and your physician/provider. We have found that certain health care needs can be provided without the need for a physical exam.  This service lets us provide the care you need with a short phone conversation.  If a prescription is necessary we can send it directly to your pharmacy.  If lab work is needed we can place an order for that and you can then stop by our lab to have the test done at a later time.    Telephone visits are billed at different rates depending on your insurance coverage. During this emergency period, for some insurers they may be billed the same as an in-person visit.  Please reach out to your insurance provider with any questions.    If during the course of the call the physician/provider feels a telephone visit is not appropriate, you will not be charged for this service.\"    Patient has given verbal consent for Telephone visit?  Yes    What phone number would you like to be contacted at? [phone]    How would you [facility] your [facility]? [facility]    HPI:     I had the privilege to evaluate Mr. Delonte Tijerina who is a 63-year old male patient with following Hx of a NSTEMI, CCS class IV, who underwent a coronary angiogram in [facility] 3/11/19 and consequently a coronary thrombectomy, and PCI + stenting - for details - see procedures.   Patient last seen in clinic in April 2019. In the interim, she has undergone 2 stress tests for chest discomfort. She first had a Lexiscan (vasodilator + low level exercise) performed in June 2019 which did not reveal any resting or inducible perfusion deficits suggestive of ischemia. Patient underwent exercise stress echocardiogram on January 7 2020 with [facility] (see Care Everywhere for details), where she exercised 9 min 18 " sec on a standard Jesus protocol (10.1 METS) with mild chest discomfort at peak exercise but not exercise-limiting dyspnea.There was some evidence of inducible inferior wall ischemia, however patient was unable to achieve target heart rate which may reduce sensitivity of this test.     He completed his cardiac rehab and has been doing very well since. He has been exercising more, mowing his lawn and doing chores around the home without symptoms. He frequently plays golf which he enjoys without symptoms. Reports taking all of his medications as directed.    Patient denies chest pain, shortness of breath, palpitations and intermittent claudication.    PAST MEDICAL HISTORY:    Coronary artery disease involving native coronary artery of native heart without angina pectoris 03/21/2019   Pure hypercholesterolemia 03/11/2019   Sinus bradycardia 03/11/2019   NSTEMI (non-ST elevated myocardial infarction) 03/10/2019   Exertional angina 03/10/2019   Other insomnia 03/10/2019   CKD (chronic kidney disease) stage 3, GFR 30-59 ml/min 03/10/2019   Primary open-angle glaucoma, left eye, moderate stage 10/30/2018   Overview:     Added automatically from request for surgery [id number]     Primary open angle glaucoma 06/23/2014   Overview:     Primary open-angle glaucoma(365.11)     Erectile dysfunction of organic origin 02/16/2012   Essential hypertension 02/18/2008   Overview:     Hypertension     Benign neoplasm of colon 12/04/2007   Overview:     LW Modifier: repeat colonoscopy 11/2012  LW Onset: [id number]  ; Polyp Colon Adenomatous            CURRENT MEDICATIONS:  Current Outpatient Medications   Medication Sig Dispense Refill     amLODIPine (NORVASC) 10 MG tablet Take 1 tablet (10 mg) by mouth At Bedtime Please keep 8-6-20 clinic appt for refills 90 tablet 0     aspirin (ASA) 81 MG chewable tablet Take 81 mg by mouth       atorvastatin (LIPITOR) 40 MG tablet Take 40 mg by mouth       brimonidine-timolol (COMBIGAN) 0.2-0.5 %  ophthalmic solution 1 drop       carvedilol (COREG) 6.25 MG tablet Take 6.25 mg by mouth 2 times daily (with meals)       diclofenac (VOLTAREN) 1 % topical gel Place 2 g onto the skin       EQL SENNA LAXATIVE 8.6 MG tablet   0     escitalopram (LEXAPRO) 10 MG tablet Take 10 mg by mouth daily       latanoprost (XALATAN) 0.005 % ophthalmic solution 1 drop       lisinopril (ZESTRIL) 40 MG tablet Take 1 tablet (40 mg) by mouth daily . Please be seen in clinic for further refills. 90 tablet 0     ondansetron (ZOFRAN-ODT) 4 MG ODT tab   0     potassium chloride ER (K-DUR/KLOR-CON M) 20 MEQ CR tablet Take 20 mEq by mouth       tadalafil (CIALIS) 2.5 MG tablet TAKE 1 TABLET DAILY       brimonidine (ALPHAGAN) 0.2 % ophthalmic solution   1     chlorhexidine (PERIDEX) 0.12 % solution 15 mLs       clopidogrel (PLAVIX) 75 MG tablet   3     methazolamide (NEPTAZANE) 50 MG tablet Take 50 mg by mouth       neomycin-polymyxin-dexamethasone (MAXITROL) 3.5-17939-9.1 ophthalmic ointment 0.5 inches       prednisoLONE acetate (PRED FORTE) 1 % ophthalmic suspension 1 drop every 2 hours         PAST SURGICAL HISTORY:  glaucoma surgery    ACHILLES TENDON SURGERY         TONSILLECTOMY         SHOULDER SURGERY         SHOULDER SURGERY 11/02/2018 Left subacromail decompression, distal clavicle excision           ALLERGIES   No Known Allergies    FAMILY HISTORY:  See Epic notes  SOCIAL HISTORY:  Social History     Socioeconomic History     Marital status:      Spouse name: Not on file     Number of children: Not on file     Years of education: Not on file     Highest education level: Not on file   Occupational History     Not on file   Social Needs     Financial resource strain: Not on file     Food insecurity     Worry: Not on file     Inability: Not on file     Transportation needs     Medical: Not on file     Non-medical: Not on file   Tobacco Use     Smoking status: Never Smoker     Smokeless tobacco: Never Used   Substance and  Sexual Activity     Alcohol use: Never     Frequency: Never     Drug use: Never     Sexual activity: Not on file   Lifestyle     Physical activity     Days per week: Not on file     Minutes per session: Not on file     Stress: Not on file   Relationships     Social connections     Talks on phone: Not on file     Gets together: Not on file     Attends Faith service: Not on file     Active member of club or organization: Not on file     Attends meetings of clubs or organizations: Not on file     Relationship status: Not on file     Intimate partner violence     Fear of current or ex partner: Not on file     Emotionally abused: Not on file     Physically abused: Not on file     Forced sexual activity: Not on file   Other Topics Concern     Not on file   Social History Narrative     Not on file       ROS:   Constitutional: No fever, chills, or sweats. No weight gain/loss   ENT: No visual disturbance, ear ache, epistaxis, sore throat  Allergies/Immunologic: Negative.   Respiratory: No cough, hemoptysia  Cardiovascular: As per HPI  GI: No nausea, vomiting, hematemesis, melena, or hematochezia  : No urinary frequency, dysuria, or hematuria  Integument: Negative  Psychiatric: Negative  Neuro: Negative  Endocrinology: Negative   Musculoskeletal: Negative      Labs:  LIPID RESULTS:  Lab Results   Component Value Date    CHOL 98 08/06/2020    HDL 40 08/06/2020    LDL 45 08/06/2020    TRIG 61 08/06/2020    NHDL 57 08/06/2020       LIVER ENZYME RESULTS:  Lab Results   Component Value Date    AST 31 08/06/2020    ALT 43 08/06/2020       BMP RESULTS:  Lab Results   Component Value Date     08/06/2020    POTASSIUM 3.4 08/06/2020    CHLORIDE 106 08/06/2020    CO2 32 08/06/2020    ANIONGAP 4 08/06/2020     (H) 08/06/2020    BUN 17 08/06/2020    CR 1.37 (H) 08/06/2020    GFRESTIMATED 53 (L) 08/06/2020    GFRESTBLACK 62 08/06/2020    PEDRO 8.6 08/06/2020                Assessment and Plan:     64 year old male with  history of NSTEMI in March 2019 presenting for annual follow up after completion of cardiac rehab. He is doing very well in his modification of cardiovascular risk factors and feels very pleased to have recovered from his heart attack. He will follow up with the clinic in 1 year and follow up sooner if any issues in the interim. His lipids are very well controlled, as is his hypertension.    #CAD, secondary prevention  #Hyperlipidemia  #Hypertension    Plan:  - continue lifestyle modification of cardiovascular risk factors  - continue atorvastatin 40 mg  - continue lisinopril 40 mg, carvedilol 6.25 mg, and amlodipine 10 mg  - continue ASA 81 mg    Tramaine Ayala MD MSc  Cardiovascular Disease Fellow  [facility]    I have  Interviewed during phone visit patient with CV fellow. I have reviewed the laboratory tests, imaging, and other investigations with patient and CV fellow. I have reviewed the management plan with the patient and the CV fellow.  I agree  the findings and plan in this CV fellow s note. In addition, changes in  assessment and plan have been incorporated into the note by myself, as to make it a single cohesive document.     25 min were spent directly during phone visit.    Kae Escudero MD, PhD  Professor of Medicine  Division of Cardiology        CC  Patient Care Team:  Abran Tapia MD as PCP - General (Family Practice)  KAE ESCUDERO

## 2020-08-06 NOTE — LETTER
"8/6/2020      RE: Delonte Tijerina  [address]  [place] [address]       Dear Colleague,    Thank you for the opportunity to participate in the care of your patient, Delonte Tijerina, at the [facility]. Please see a copy of my visit note below.        Delonte Tijerina is a 64 year old male who is being evaluated via a billable telephone visit.      The patient has been notified of following:     \"This telephone visit will be conducted via a call between you and your physician/provider. We have found that certain health care needs can be provided without the need for a physical exam.  This service lets us provide the care you need with a short phone conversation.  If a prescription is necessary we can send it directly to your pharmacy.  If lab work is needed we can place an order for that and you can then stop by our lab to have the test done at a later time.    Telephone visits are billed at different rates depending on your insurance coverage. During this emergency period, for some insurers they may be billed the same as an in-person visit.  Please reach out to your insurance provider with any questions.    If during the course of the call the physician/provider feels a telephone visit is not appropriate, you will not be charged for this service.\"    Patient has given verbal consent for Telephone visit?  Yes    What phone number would you like to be contacted at? [phone]    How [facility]    HPI:     I had the privilege to evaluate Mr. Delonte Tijerina who is a 63-year old male patient with following Hx of a NSTEMI, CCS class IV, who underwent a coronary angiogram in [facility] 3/11/19 and consequently a coronary thrombectomy, and PCI + stenting - for details - see procedures.   Patient last seen in clinic in April 2019. In the interim, she has undergone 2 stress tests for chest discomfort. She " first had a Lexiscan (vasodilator + low level exercise) performed in June 2019 which did not reveal any resting or inducible perfusion deficits suggestive of ischemia. Patient underwent exercise stress echocardiogram on January 7 2020 with [facility] (see Care Everywhere for details), where she exercised 9 min 18 sec on a standard Jesus protocol (10.1 METS) with mild chest discomfort at peak exercise but not exercise-limiting dyspnea.There was some evidence of inducible inferior wall ischemia, however patient was unable to achieve target heart rate which may reduce sensitivity of this test.     He completed his cardiac rehab and has been doing very well since. He has been exercising more, mowing his lawn and doing chores around the home without symptoms. He frequently plays golf which he enjoys without symptoms. Reports taking all of his medications as directed.    Patient denies chest pain, shortness of breath, palpitations and intermittent claudication.    PAST MEDICAL HISTORY:    Coronary artery disease involving native coronary artery of native heart without angina pectoris 03/21/2019   Pure hypercholesterolemia 03/11/2019   Sinus bradycardia 03/11/2019   NSTEMI (non-ST elevated myocardial infarction) 03/10/2019   Exertional angina 03/10/2019   Other insomnia 03/10/2019   CKD (chronic kidney disease) stage 3, GFR 30-59 ml/min 03/10/2019   Primary open-angle glaucoma, left eye, moderate stage 10/30/2018   Overview:     Added automatically from request for surgery [id number]     Primary open angle glaucoma 06/23/2014   Overview:     Primary open-angle glaucoma(365.11)     Erectile dysfunction of organic origin 02/16/2012   Essential hypertension 02/18/2008   Overview:     Hypertension     Benign neoplasm of colon 12/04/2007   Overview:     LW Modifier: repeat colonoscopy 11/2012  LW Onset: [id number]  ; Polyp Colon Adenomatous            CURRENT MEDICATIONS:  Current Outpatient Medications   Medication Sig  Dispense Refill     amLODIPine (NORVASC) 10 MG tablet Take 1 tablet (10 mg) by mouth At Bedtime Please keep 8-6-20 clinic appt for refills 90 tablet 0     aspirin (ASA) 81 MG chewable tablet Take 81 mg by mouth       atorvastatin (LIPITOR) 40 MG tablet Take 40 mg by mouth       brimonidine-timolol (COMBIGAN) 0.2-0.5 % ophthalmic solution 1 drop       carvedilol (COREG) 6.25 MG tablet Take 6.25 mg by mouth 2 times daily (with meals)       diclofenac (VOLTAREN) 1 % topical gel Place 2 g onto the skin       EQL SENNA LAXATIVE 8.6 MG tablet   0     escitalopram (LEXAPRO) 10 MG tablet Take 10 mg by mouth daily       latanoprost (XALATAN) 0.005 % ophthalmic solution 1 drop       lisinopril (ZESTRIL) 40 MG tablet Take 1 tablet (40 mg) by mouth daily . Please be seen in clinic for further refills. 90 tablet 0     ondansetron (ZOFRAN-ODT) 4 MG ODT tab   0     potassium chloride ER (K-DUR/KLOR-CON M) 20 MEQ CR tablet Take 20 mEq by mouth       tadalafil (CIALIS) 2.5 MG tablet TAKE 1 TABLET DAILY       brimonidine (ALPHAGAN) 0.2 % ophthalmic solution   1     chlorhexidine (PERIDEX) 0.12 % solution 15 mLs       clopidogrel (PLAVIX) 75 MG tablet   3     methazolamide (NEPTAZANE) 50 MG tablet Take 50 mg by mouth       neomycin-polymyxin-dexamethasone (MAXITROL) 3.5-16978-0.1 ophthalmic ointment 0.5 inches       prednisoLONE acetate (PRED FORTE) 1 % ophthalmic suspension 1 drop every 2 hours         PAST SURGICAL HISTORY:  glaucoma surgery    ACHILLES TENDON SURGERY         TONSILLECTOMY         SHOULDER SURGERY         SHOULDER SURGERY 11/02/2018 Left subacromail decompression, distal clavicle excision       ALLERGIES   No Known Allergies    FAMILY HISTORY:  See Epic notes  SOCIAL HISTORY:  Social History     Socioeconomic History     Marital status:      Spouse name: Not on file     Number of children: Not on file     Years of education: Not on file     Highest education level: Not on file   Occupational History     Not  on file   Social Needs     Financial resource strain: Not on file     Food insecurity     Worry: Not on file     Inability: Not on file     Transportation needs     Medical: Not on file     Non-medical: Not on file   Tobacco Use     Smoking status: Never Smoker     Smokeless tobacco: Never Used   Substance and Sexual Activity     Alcohol use: Never     Frequency: Never     Drug use: Never     Sexual activity: Not on file   Lifestyle     Physical activity     Days per week: Not on file     Minutes per session: Not on file     Stress: Not on file   Relationships     Social connections     Talks on phone: Not on file     Gets together: Not on file     Attends Hindu service: Not on file     Active member of club or organization: Not on file     Attends meetings of clubs or organizations: Not on file     Relationship status: Not on file     Intimate partner violence     Fear of current or ex partner: Not on file     Emotionally abused: Not on file     Physically abused: Not on file     Forced sexual activity: Not on file   Other Topics Concern     Not on file   Social History Narrative     Not on file       ROS:   Constitutional: No fever, chills, or sweats. No weight gain/loss   ENT: No visual disturbance, ear ache, epistaxis, sore throat  Allergies/Immunologic: Negative.   Respiratory: No cough, hemoptysia  Cardiovascular: As per HPI  GI: No nausea, vomiting, hematemesis, melena, or hematochezia  : No urinary frequency, dysuria, or hematuria  Integument: Negative  Psychiatric: Negative  Neuro: Negative  Endocrinology: Negative   Musculoskeletal: Negative    Labs:  LIPID RESULTS:  Lab Results   Component Value Date    CHOL 98 08/06/2020    HDL 40 08/06/2020    LDL 45 08/06/2020    TRIG 61 08/06/2020    NHDL 57 08/06/2020       LIVER ENZYME RESULTS:  Lab Results   Component Value Date    AST 31 08/06/2020    ALT 43 08/06/2020     BMP RESULTS:  Lab Results   Component Value Date     08/06/2020    POTASSIUM 3.4  08/06/2020    CHLORIDE 106 08/06/2020    CO2 32 08/06/2020    ANIONGAP 4 08/06/2020     (H) 08/06/2020    BUN 17 08/06/2020    CR 1.37 (H) 08/06/2020    GFRESTIMATED 53 (L) 08/06/2020    GFRESTBLACK 62 08/06/2020    PEDRO 8.6 08/06/2020      Assessment and Plan:     64 year old male with history of NSTEMI in March 2019 presenting for annual follow up after completion of cardiac rehab. He is doing very well in his modification of cardiovascular risk factors and feels very pleased to have recovered from his heart attack. He will follow up with the clinic in 1 year and follow up sooner if any issues in the interim. His lipids are very well controlled, as is his hypertension.    #CAD, secondary prevention  #Hyperlipidemia  #Hypertension    Plan:  - continue lifestyle modification of cardiovascular risk factors  - continue atorvastatin 40 mg  - continue lisinopril 40 mg, carvedilol 6.25 mg, and amlodipine 10 mg  - continue ASA 81 mg    Tramaine Ayala MD MSc  Cardiovascular Disease Fellow  [facility]    I have  Interviewed during phone visit patient with CV fellow. I have reviewed the laboratory tests, imaging, and other investigations with patient and CV fellow. I have reviewed the management plan with the patient and the CV fellow.  I agree  the findings and plan in this CV fellow s note. In addition, changes in  assessment and plan have been incorporated into the note by myself, as to make it a single cohesive document.     25 min were spent directly during phone visit.    Kae Escudero MD, PhD  Professor of Medicine  Division of Cardiology    CC  Patient Care Team:  Abran Tapia MD as PCP - General (Family Practice)  KAE ESCUDERO    Please do not hesitate to contact me if you have any questions/concerns.     Sincerely,     Kae Escudero MD

## 2020-08-06 NOTE — PATIENT INSTRUCTIONS
Patient Instructions:  It was a pleasure to see you in the cardiology clinic today.      If you have any questions, you can reach my nurse, Estela OAKLEY LPN, at [phone].  Press Option #1 for the [facility], and then press Option #4 for nursing.    We are encouraging the use of Skait to communicate with your HealthCare Provider    Medication Changes: None.    Recommendations: None.    Studies Ordered: None.    The results from today include: Labs.    Please follow up: With Dr. Fernandes in one year with fasting labs prior.    Sincerely,    Duncan Fernandes MD     If you have an urgent need after hours (8:00 am to 4:30 pm) please call [phone] and ask for the cardiology fellow on call.

## 2021-01-15 ENCOUNTER — HEALTH MAINTENANCE LETTER (OUTPATIENT)
Age: 66
End: 2021-01-15

## 2021-04-09 ENCOUNTER — TELEPHONE (OUTPATIENT)
Dept: CARDIOLOGY | Facility: CLINIC | Age: 66
End: 2021-04-09

## 2021-05-09 ENCOUNTER — HEALTH MAINTENANCE LETTER (OUTPATIENT)
Age: 66
End: 2021-05-09

## 2021-08-25 DIAGNOSIS — I21.4 NSTEMI (NON-ST ELEVATED MYOCARDIAL INFARCTION) (H): ICD-10-CM

## 2021-08-30 RX ORDER — AMLODIPINE BESYLATE 10 MG/1
10 TABLET ORAL AT BEDTIME
Qty: 90 TABLET | Refills: 0 | Status: SHIPPED | OUTPATIENT
Start: 2021-08-30 | End: 2022-01-14

## 2021-08-30 NOTE — TELEPHONE ENCOUNTER
LCV:   8/6/2020  [facility]  overdue BP check, appt and lab Cr- FYI to clinic  RF 90 day  Scheduling has been notified to contact the pt for appointment.

## 2021-10-24 ENCOUNTER — HEALTH MAINTENANCE LETTER (OUTPATIENT)
Age: 66
End: 2021-10-24

## 2021-11-28 DIAGNOSIS — I21.4 NSTEMI (NON-ST ELEVATED MYOCARDIAL INFARCTION) (H): ICD-10-CM

## 2021-12-01 NOTE — TELEPHONE ENCOUNTER
amLODIPine Besylate Oral Tablet 10 MG      Last Written Prescription Date:  8-30-21  Last Fill Quantity: 90,   # refills: 0  Last Office Visit : 8-6-20 ( RTC 1 Y)  Future Office visit:  none    Routing refill request to provider for review/approval because:  Overdue BP check, and lab: Cr    Robert HARVEY

## 2021-12-06 RX ORDER — AMLODIPINE BESYLATE 10 MG/1
10 TABLET ORAL AT BEDTIME
Qty: 30 TABLET | OUTPATIENT
Start: 2021-12-06

## 2021-12-28 DIAGNOSIS — I21.4 NSTEMI (NON-ST ELEVATED MYOCARDIAL INFARCTION) (H): ICD-10-CM

## 2021-12-31 ENCOUNTER — MYC MEDICAL ADVICE (OUTPATIENT)
Dept: CARDIOLOGY | Facility: CLINIC | Age: 66
End: 2021-12-31
Payer: COMMERCIAL

## 2021-12-31 NOTE — TELEPHONE ENCOUNTER
amLODIPine (NORVASC) 10 MG tablet   Take 1 tablet (10 mg) by mouth At Bedtime      Last Written Prescription Date:  8/30/21  Last Fill Quantity: 90,   # refills: 0  Last Office Visit : 8/6/20  Future Office visit:  none    Routing refill request to provider for review/approval because:  Overdue visit and BP. Abnormal lab creatinine   3/18/21 BMP RECENT OUTSIDE LABS AVAILABLE IN THE LAB TAB OR CARE EVERYWHERE.

## 2022-01-14 ENCOUNTER — TELEPHONE (OUTPATIENT)
Dept: CARDIOLOGY | Facility: CLINIC | Age: 67
End: 2022-01-14
Payer: COMMERCIAL

## 2022-01-14 DIAGNOSIS — I21.4 NSTEMI (NON-ST ELEVATED MYOCARDIAL INFARCTION) (H): ICD-10-CM

## 2022-01-14 RX ORDER — AMLODIPINE BESYLATE 10 MG/1
10 TABLET ORAL AT BEDTIME
Qty: 60 TABLET | Refills: 1 | Status: SHIPPED | OUTPATIENT
Start: 2022-01-14

## 2022-01-14 RX ORDER — AMLODIPINE BESYLATE 10 MG/1
10 TABLET ORAL AT BEDTIME
Qty: 90 TABLET | OUTPATIENT
Start: 2022-01-14

## 2022-01-14 NOTE — TELEPHONE ENCOUNTER
[facility] Call Center    Phone Message    May a detailed message be left on voicemail: yes     Reason for Call: Other: Pt has been out of the amLODIPine (NORVASC) 10 MG tablet for 3 weeks now.  He has scheduled an appt w/Dr. Fernandes for April 11, 2022 as advised in order to get his meds refilled.  Please contact his pharmacy [facility] - [place], [place] - [address] to refill this med.      Action Taken: Message routed to:  [facility] ([facility] cardio    Travel Screening: Not Applicable

## 2022-01-14 NOTE — TELEPHONE ENCOUNTER
Called pt and left another VM to notify that he needs to schedule a visit with Dr. Fernandes and have his labs checked before we can continuing filling his prescriptions.

## 2022-01-14 NOTE — TELEPHONE ENCOUNTER
Gave pt 4 month supply of amlodipine--to get him to his appointment in April. Called pt and left VM to notify.    Notified pt he does still need to make a lab appointment. Also asked pt if he needs any other medications filled.

## 2022-02-28 ENCOUNTER — TELEPHONE (OUTPATIENT)
Dept: OPHTHALMOLOGY | Facility: CLINIC | Age: 67
End: 2022-02-28
Payer: COMMERCIAL

## 2022-02-28 ENCOUNTER — TRANSCRIBE ORDERS (OUTPATIENT)
Dept: OTHER | Age: 67
End: 2022-02-28
Payer: COMMERCIAL

## 2022-02-28 ENCOUNTER — MEDICAL CORRESPONDENCE (OUTPATIENT)
Dept: HEALTH INFORMATION MANAGEMENT | Facility: CLINIC | Age: 67
End: 2022-02-28
Payer: COMMERCIAL

## 2022-02-28 DIAGNOSIS — H40.042 STEROID RESPONDER, LEFT EYE: ICD-10-CM

## 2022-02-28 DIAGNOSIS — H20.022 RECURRENT IRITIS, LEFT: Primary | ICD-10-CM

## 2022-02-28 DIAGNOSIS — H02.402 PTOSIS, LEFT EYELID: Primary | ICD-10-CM

## 2022-03-01 ENCOUNTER — TELEPHONE (OUTPATIENT)
Dept: OPHTHALMOLOGY | Facility: CLINIC | Age: 67
End: 2022-03-01
Payer: COMMERCIAL

## 2022-03-08 ENCOUNTER — TELEPHONE (OUTPATIENT)
Dept: OPHTHALMOLOGY | Facility: CLINIC | Age: 67
End: 2022-03-08
Payer: COMMERCIAL

## 2022-03-08 NOTE — TELEPHONE ENCOUNTER
Patient called requesting a sooner appointment with . Nothing sooner was available. After further review of Referral from  there was another Eye Referral to . Scheduled patient accordingly for tomorrow 3/9/2022 with  for Recurrent iritis, left/- Swollen Eye and shooting pain Referral from Dr.Matthew Mello. Patient was instructed on Eye Clinic location and is aware of time.-Per Patient

## 2022-03-09 ENCOUNTER — OFFICE VISIT (OUTPATIENT)
Dept: OPHTHALMOLOGY | Facility: CLINIC | Age: 67
End: 2022-03-09
Attending: OPHTHALMOLOGY
Payer: COMMERCIAL

## 2022-03-09 DIAGNOSIS — H21.02 HYPHEMA OF LEFT EYE: ICD-10-CM

## 2022-03-09 DIAGNOSIS — H40.042 STEROID RESPONDER, LEFT EYE: ICD-10-CM

## 2022-03-09 DIAGNOSIS — H35.712 CENTRAL SEROUS CHORIORETINOPATHY OF LEFT EYE: ICD-10-CM

## 2022-03-09 DIAGNOSIS — H20.12 CHRONIC IRITIS, LEFT EYE: Primary | ICD-10-CM

## 2022-03-09 PROCEDURE — 99204 OFFICE O/P NEW MOD 45 MIN: CPT | Mod: GC | Performed by: OPHTHALMOLOGY

## 2022-03-09 PROCEDURE — 92134 CPTRZ OPH DX IMG PST SGM RTA: CPT | Performed by: OPHTHALMOLOGY

## 2022-03-09 PROCEDURE — G0463 HOSPITAL OUTPT CLINIC VISIT: HCPCS | Mod: 25

## 2022-03-09 PROCEDURE — 92250 FUNDUS PHOTOGRAPHY W/I&R: CPT | Performed by: OPHTHALMOLOGY

## 2022-03-09 PROCEDURE — 92242 FLUORESCEIN&ICG ANGIOGRAPHY: CPT | Performed by: OPHTHALMOLOGY

## 2022-03-09 RX ORDER — VALACYCLOVIR HYDROCHLORIDE 1 G/1
1000 TABLET, FILM COATED ORAL 3 TIMES DAILY
Qty: 90 TABLET | Refills: 1 | Status: SHIPPED | OUTPATIENT
Start: 2022-03-09 | End: 2022-06-28

## 2022-03-09 RX ORDER — DORZOLAMIDE HCL 20 MG/ML
1 SOLUTION/ DROPS OPHTHALMIC 3 TIMES DAILY
Qty: 10 ML | Refills: 4 | Status: SHIPPED | OUTPATIENT
Start: 2022-03-09 | End: 2022-03-09

## 2022-03-09 RX ORDER — PREDNISOLONE ACETATE 10 MG/ML
1 SUSPENSION/ DROPS OPHTHALMIC 3 TIMES DAILY
Qty: 10 ML | Refills: 4 | Status: SHIPPED | OUTPATIENT
Start: 2022-03-09 | End: 2022-06-28

## 2022-03-09 RX ORDER — DORZOLAMIDE HCL 20 MG/ML
1 SOLUTION/ DROPS OPHTHALMIC 3 TIMES DAILY
Qty: 10 ML | Refills: 4 | Status: SHIPPED | OUTPATIENT
Start: 2022-03-09 | End: 2022-06-28

## 2022-03-09 ASSESSMENT — TONOMETRY
OD_IOP_MMHG: 19
IOP_METHOD: TONOPEN
OS_IOP_MMHG: 20
OD_IOP_MMHG: 18
IOP_METHOD: APP BY JY
OS_IOP_MMHG: 23

## 2022-03-09 ASSESSMENT — VISUAL ACUITY
METHOD: SNELLEN - LINEAR
OD_CC: 20/30
CORRECTION_TYPE: GLASSES
OD_PH_CC+: -2
OS_CC: 20/40
OD_PH_CC: 20/20

## 2022-03-09 ASSESSMENT — SLIT LAMP EXAM - LIDS
COMMENTS: PTOSIS
COMMENTS: PTOSIS

## 2022-03-09 ASSESSMENT — CONF VISUAL FIELD
OD_NORMAL: 1
OS_NORMAL: 1

## 2022-03-09 ASSESSMENT — EXTERNAL EXAM - RIGHT EYE: OD_EXAM: NORMAL

## 2022-03-09 ASSESSMENT — CUP TO DISC RATIO
OD_RATIO: 0.5
OS_RATIO: 0.6

## 2022-03-09 ASSESSMENT — EXTERNAL EXAM - LEFT EYE: OS_EXAM: NORMAL

## 2022-03-09 NOTE — LETTER
3/9/2022       RE: Delonte Tijerina  [address]  [place] [facility] [address]     Dear Colleague,    Thank you for referring your patient, Delonte Tijerina, to the [facility] [place] at [facility]. Please see a copy of my visit note below.    Chief Complaint/Presenting Concern: Uveitis evaluation    History of Present Illness:   Delonte Tijerina is a 66 year old patient who presents for evaluation of uveitis of the left eye from Dr. Abel Mello.    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 Abel Mello.  He has never had any specific treatment that he recalls for the central serous retinopathy but has undergone a trabeculectomy surgery for the glaucoma.     The uveitis in the left eye started sometime after the cataract surgery in the left eye which was performed in March 2021.  This has generally been treated with steroid eyedrops and has been fairly well controlled although the eye pressure has been elevated in response to the steroid eyedrop use and there has not been an ability to completely taper off steroids    On Sunday, 3/6.22, Pastor Tijerina awoke in the night with a tremendous headache and eye pain and discomfort of the left eye. He took tylenol for it and went back to bed. When he awoke in the morning, his vision was very cloudy in the left eye. Mr. Tijerina does recall that he had a bowel cleansing probiotic on Saturday (few days ago) and symptoms started the next day. Mr. Tijerina went to see an ophthalmologist on 3/7/22 who was on call at Dr. Mello's practice. He was found to have significant inflammation of the left eye. Because he had this appointment with us, changes to his steroid drops were deferred    Dr. Mello has also referred Mr. Tijerina to the [facility] oculoplastics service for ptosis evaluation      Additional Ocular  History:   Trabeculectomy, left eye 5/2019. His IOP max prior to his left trabeculectomy was 44 in the left eye.  Cataract surgery, left eye 3/2021 ([facility])  He does not recall any injury to his right eye.     Relevant Past Medical/Family/Social History:  Myocardial infarction (~2019)  Hypertension  Hyperlipidemia  Chronic kidney disease  No diabetes,No tobacco,No illicit drug use, No alcohol    Had chicken pox as a child, No history of shingles. No history of cold sores.      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 , 1 child, No recent travel nor new pets.    Relevant Review of Systems:Gets headaches 1-2x/week, respond to tylenol, not changed  No fever, weight loss, mouth sores, epistaxis, dysacusia, dizziness, tinnitus, cough, dyspnea, palpitations, abdominal pain, diarrhea, dysuria, arthralgias, rash, weakness, numbness. NO cold sores.    Laboratory Testing  Abnormal: 3/2021 - Elevated Creatinine  Normal/negative: 2014 - negative chlamydia, syphilis, gonorrhea;      Current eye related medications: Combigan 2x/day each eye; latanoprost 1x/day at bedtime, each eye; prednisolone daily left eye    Retina/Uveitis Imaging:  OCT Spectralis Macula March 9, 2022  right eye: Normal foveal contour, clear detail of retinal layers, thick choroid  left eye: normal foveal contour, no fluid, irregular outer segments near the fovea, thick choroid.     Optos Fundus Autofluorescence March 9, 2022  right eye: Normal autofluorescence  left eye: hyper-AF area ST macula with mild guttering    Optos Fundus Photos OU (both eyes) March 9, 2022  right eye: Normal disc color, no obvious cupping, macula, vessels, and periphery normal  left eye: disc cupping, macular mottling, vessels and periphery unremarkable    Optos FA/ICG March 9, 2022  right eye: normal disc without  leakage, no pettaloid leakage at macula, no vessel leakage.  ICG: Generally normal. very late frames with speckled hyper-cyanescent spots in nasal periphery  left eye: Patchy area of hyperfluorescence in the temporal macula.  Patchy partial arc of hyperfluorescence around the fovea.  Late disc rim  Leakage.  Patchy hyper-cyanescent area in the superotemporal macula and hypocyanescent  spot near the fovea  Assessment:    1. Chronic iritis, left eye  Persistent for nearly 1 year with worsening over the last few days    2. Hyphema of left eye  This is only visible on gonioscopy. There is no overt neovascularization of the angle or iris and no evidence of retinal vascular occlusions.  Gonioscopy performed by our fellow showed focal areas of peripheral anterior synechiae, which were less visualized by the attending physician post dilation    There may be a relationship to viral mediated uveitis    3. Steroid responder, left eye  By history, he has experienced significant increases in pressure with drops.  IOP 20 today by applanation    4. Central serous chorioretinopathy of left eye  Autofluorescence, OCT, and fluorescein and ICG angiography are consistent with this diagnosis. OCT did not capture the main area of concern in the left eye, but does show a thickened choroid in each eye, but no fluid on OCT    Plan/Recommendations:    Discussed findings with patient.  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  Examination and gonioscopy showed a hyphema without evidence of retinal vascular occlusions.  Given the history of intraocular pressure elevation, there is a possible relationship to viral mediated anterior uveitis    We discussed consideration of diagnostic anterior chamber paracentesis to look for herpes simplex or varicella-zoster viral DNA.  I explained to the patient that we would likely start a course of oral antiviral in addition to increasing the topical steroid  frequency, so we elected to defer the diagnostic procedure but start oral antivirals as below    The central serous retinopathy in the left eye is inactive.  We will be judicious with topical steroids but do not anticipate the need for oral steroids at this time    No lab testing recommended at this time    We will continue Combigan 2x/day in each eye and Latanoprost in each eye at night    Increase the prednisolone (steroid drop) to 3x/day in the left eye     Add a new drop for eye pressure lowering called Dorzolamide (orange cap) 3x/day in BOTH EYES given that the right eye pressure is slightly elevated above baseline    Add a new pill called Valacyclovir (Valtrex) 1000 mg. Take one pill three times daily with food.  This is the dose for varicella-zoster given the history of chickenpox as a child, even without a history of shingles as an adult    RTC   1. Dr. Arias as scheduled next week.  Okay to see Kailash same day if not uveitis not improving    2. Return for 3 weeks , Applanate, Gonio, no dilation, No testing.     Carlitos Jones MD  Retina Fellow, PGY5        Attending Physician Attestation:  Complete documentation of historical and exam elements from today's encounter can be found in the full encounter summary report (not reduplicated in this progress note). I personally obtained the chief complaint(s) and history of present illness. I confirmed and edited as necessary the review of systems, past medical/surgical history, family history, social history, and examination findings as documented by others; and I examined the patient myself. I personally reviewed the relevant tests, images, and reports as documented above. I formulated and edited as necessary the assessment and plan and discussed the findings and management plan with the patient and family.  Syd Linder MD.      Sincerely,    Syd Linder MD  [facility] Dept of Ophthalmology  Uveitis and Medical Retina

## 2022-03-09 NOTE — PATIENT INSTRUCTIONS
There is inflammation and the eye pressure is slightly elevated in the right eye and also left eye. Here is the plan:     Continue Combigan 2x/day in each eye and Latanoprost in each eye at night    Increase the prednisolone (steroid drop) to 3x/day in the left eye     Add a new drop for eye pressure lowering called Dorzolamide (orange cap) 3x/day in BOTH EYES    Add a new pill called Valacyclovir (Valtrex) 1000 mg. Take one pill three times daily with food.

## 2022-03-09 NOTE — NURSING NOTE
Chief Complaints and History of Present Illnesses   Patient presents with     Consult For     Chief Complaint(s) and History of Present Illness(es)     Consult For     Laterality: both eyes    Associated symptoms: eye pain, photophobia and redness.  Negative for headache              Comments     Pt here for consult per Dr Dong Mello for Recurrent Iritis, , swollen eye with sharp shooting pain across the left side of the head. Pt notes inflammation since his surgery 2 years ago but has recently ( Sunday) has gotten worse.   Pt using:  Combigan  Latanoprost  Prednisolone   BRIE GLASS 8:05 AM March 9, 2022

## 2022-03-09 NOTE — PROGRESS NOTES
Chief Complaint/Presenting Concern: Uveitis evaluation    History of Present Illness:   Delonte Tijerina is a 66 year old patient who presents for evaluation of uveitis of the left eye from Dr. Abel Mello.    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 Abel Mello.  He has never had any specific treatment that he recalls for the central serous retinopathy but has undergone a trabeculectomy surgery for the glaucoma.     The uveitis in the left eye started sometime after the cataract surgery in the left eye which was performed in March 2021.  This has generally been treated with steroid eyedrops and has been fairly well controlled although the eye pressure has been elevated in response to the steroid eyedrop use and there has not been an ability to completely taper off steroids    On Sunday, 3/6.22, Pastor Tijerina awoke in the night with a tremendous headache and eye pain and discomfort of the left eye. He took tylenol for it and went back to bed. When he awoke in the morning, his vision was very cloudy in the left eye. Mr. Tijerina does recall that he had a bowel cleansing probiotic on Saturday (few days ago) and symptoms started the next day. Mr. Tijerina went to see an ophthalmologist on 3/7/22 who was on call at Dr. Mello's practice. He was found to have significant inflammation of the left eye. Because he had this appointment with us, changes to his steroid drops were deferred    Dr. Mello has also referred Mr. Tijerina to the [facility] oculoplastics service for ptosis evaluation    Additional Ocular History:   Trabeculectomy, left eye 5/2019. His IOP max prior to his left trabeculectomy was 44 in the left eye.  Cataract surgery, left eye 3/2021 ([facility])  He does not recall any injury to his right eye.     Relevant Past Medical/Family/Social History:  Myocardial infarction  (~2019)  Hypertension  Hyperlipidemia  Chronic kidney disease  No diabetes,No tobacco,No illicit drug use, No alcohol    Had chicken pox as a child, No history of shingles. No history of cold sores.      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 , 1 child, No recent travel nor new pets.    Relevant Review of Systems:Gets headaches 1-2x/week, respond to tylenol, not changed  No fever, weight loss, mouth sores, epistaxis, dysacusia, dizziness, tinnitus, cough, dyspnea, palpitations, abdominal pain, diarrhea, dysuria, arthralgias, rash, weakness, numbness. NO cold sores.    Laboratory Testing  Abnormal: 3/2021 - Elevated Creatinine  Normal/negative: 2014 - negative chlamydia, syphilis, gonorrhea;      Current eye related medications: Combigan 2x/day each eye; latanoprost 1x/day at bedtime, each eye; prednisolone daily left eye    Retina/Uveitis Imaging:  OCT Spectralis Macula March 9, 2022  right eye: Normal foveal contour, clear detail of retinal layers, thick choroid  left eye: normal foveal contour, no fluid, irregular outer segments near the fovea, thick choroid.     Optos Fundus Autofluorescence March 9, 2022  right eye: Normal autofluorescence  left eye: hyper-AF area ST macula with mild guttering    Optos Fundus Photos OU (both eyes) March 9, 2022  right eye: Normal disc color, no obvious cupping, macula, vessels, and periphery normal  left eye: disc cupping, macular mottling, vessels and periphery unremarkable    Optos FA/ICG March 9, 2022  right eye: normal disc without leakage, no pettaloid leakage at macula, no vessel leakage.  ICG: Generally normal. very late frames with speckled hyper-cyanescent spots in nasal periphery  left eye: Patchy area of hyperfluorescence in the temporal macula.  Patchy partial arc of hyperfluorescence around the  fovea.  Late disc rim  Leakage.  Patchy hyper-cyanescent area in the superotemporal macula and hypocyanescent  spot near the fovea    Assessment:    1. Chronic iritis, left eye  Persistent for nearly 1 year with worsening over the last few days    2. Hyphema of left eye  This is only visible on gonioscopy. There is no overt neovascularization of the angle or iris and no evidence of retinal vascular occlusions.  Gonioscopy performed by our fellow showed focal areas of peripheral anterior synechiae, which were less visualized by the attending physician post dilation    There may be a relationship to viral mediated uveitis    3. Steroid responder, left eye  By history, he has experienced significant increases in pressure with drops.  IOP 20 today by applanation    4. Central serous chorioretinopathy of left eye  Autofluorescence, OCT, and fluorescein and ICG angiography are consistent with this diagnosis. OCT did not capture the main area of concern in the left eye, but does show a thickened choroid in each eye, but no fluid on OCT    Plan/Recommendations:    Discussed findings with patient.  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  Examination and gonioscopy showed a hyphema without evidence of retinal vascular occlusions.  Given the history of intraocular pressure elevation, there is a possible relationship to viral mediated anterior uveitis    We discussed consideration of diagnostic anterior chamber paracentesis to look for herpes simplex or varicella-zoster viral DNA.  I explained to the patient that we would likely start a course of oral antiviral in addition to increasing the topical steroid frequency, so we elected to defer the diagnostic procedure but start oral antivirals as below    The central serous retinopathy in the left eye is inactive.  We will be judicious with topical steroids but do not anticipate the need for oral steroids at this time    No lab testing  recommended at this time    We will continue Combigan 2x/day in each eye and Latanoprost in each eye at night    Increase the prednisolone (steroid drop) to 3x/day in the left eye     Add a new drop for eye pressure lowering called Dorzolamide (orange cap) 3x/day in BOTH EYES given that the right eye pressure is slightly elevated above baseline    Add a new pill called Valacyclovir (Valtrex) 1000 mg. Take one pill three times daily with food.  This is the dose for varicella-zoster given the history of chickenpox as a child, even without a history of shingles as an adult    RTC   1. Dr. Arias as scheduled next week.  Okay to see Kailash same day if not uveitis not improving    2. Return for 3 weeks , Applanate, Gonio, no dilation, No testing.     Carlitos Jones MD  Retina Fellow, PGY5    Attending Physician Attestation:  Complete documentation of historical and exam elements from today's encounter can be found in the full encounter summary report (not reduplicated in this progress note). I reviewed the chief complaint(s) and history of present illness, and  confirmed and edited as necessary the review of systems, past medical/surgical history, family history, social history, and examination findings as documented by others and the treating Resident or Fellow Physician.    I examined the patient myself, discussed the findings, reviewed all ancillary testing data and modified these results and reports along with the assessment and plan with the Treating Resident or Fellow Physician. I agree with the note as detailed above.   Syd Linder M.D.  Uveitis and Medical Retina  March 9, 2022

## 2022-03-09 NOTE — Clinical Note
Lloyd Urbina: As always, thank you for your help in clinic.. Please review my note for style and content.  Let me know if you have any questions.

## 2022-03-11 PROBLEM — H35.712 CENTRAL SEROUS CHORIORETINOPATHY OF LEFT EYE: Status: ACTIVE | Noted: 2022-03-11

## 2022-03-11 PROBLEM — H20.12: Status: ACTIVE | Noted: 2022-03-11

## 2022-03-16 ENCOUNTER — OFFICE VISIT (OUTPATIENT)
Dept: OPHTHALMOLOGY | Facility: CLINIC | Age: 67
End: 2022-03-16
Payer: COMMERCIAL

## 2022-03-16 DIAGNOSIS — H35.712 CENTRAL SEROUS CHORIORETINOPATHY OF LEFT EYE: ICD-10-CM

## 2022-03-16 DIAGNOSIS — H02.401 INVOLUTIONAL PTOSIS, ACQUIRED, RIGHT: ICD-10-CM

## 2022-03-16 DIAGNOSIS — H02.402 PTOSIS, LEFT EYELID: Primary | ICD-10-CM

## 2022-03-16 DIAGNOSIS — Z11.59 ENCOUNTER FOR SCREENING FOR OTHER VIRAL DISEASES: Primary | ICD-10-CM

## 2022-03-16 DIAGNOSIS — H40.042 STEROID RESPONDER, LEFT EYE: ICD-10-CM

## 2022-03-16 DIAGNOSIS — H20.12 CHRONIC IRITIS, LEFT EYE: ICD-10-CM

## 2022-03-16 PROCEDURE — 92285 EXTERNAL OCULAR PHOTOGRAPHY: CPT | Performed by: OPHTHALMOLOGY

## 2022-03-16 PROCEDURE — 99214 OFFICE O/P EST MOD 30 MIN: CPT | Performed by: OPHTHALMOLOGY

## 2022-03-16 PROCEDURE — 92081 LIMITED VISUAL FIELD XM: CPT | Performed by: OPHTHALMOLOGY

## 2022-03-16 ASSESSMENT — CONF VISUAL FIELD
OS_SUPERIOR_NASAL_RESTRICTION: 3
OD_NORMAL: 1

## 2022-03-16 ASSESSMENT — EXTERNAL EXAM - RIGHT EYE: OD_EXAM: NORMAL

## 2022-03-16 ASSESSMENT — VISUAL ACUITY
CORRECTION_TYPE: GLASSES
OS_CC: 20/30
OD_CC+: -2
OD_CC: 20/20
METHOD: SNELLEN - LINEAR

## 2022-03-16 ASSESSMENT — TONOMETRY
IOP_METHOD: ICARE
OS_IOP_MMHG: 13
OD_IOP_MMHG: 16

## 2022-03-16 ASSESSMENT — EXTERNAL EXAM - LEFT EYE: OS_EXAM: NORMAL

## 2022-03-16 ASSESSMENT — SLIT LAMP EXAM - LIDS: COMMENTS: PTOSIS

## 2022-03-16 NOTE — NURSING NOTE
Chief Complaints and History of Present Illnesses   Patient presents with     Consult For     Chief Complaint(s) and History of Present Illness(es)     Consult For     Laterality: both eyes    Onset: 18 months ago    Course: gradually worsening    Associated symptoms: eye pain and dryness.  Negative for redness and tearing    Treatments tried: artificial tears and eye drops    Pain scale: 0/10              Comments     Patient was referred for a ptosis evaluation from Dr Linder.  Eighteen months ago he had a surgery to repair his left retina and another for glaucoma in his left eye.  Since then his left upper lid has drooped.  He has trouble reading and is aware of peripheral vision loss due to the current lid position.      Lizzy Duncan, COT 2:21 PM  March 16, 2022

## 2022-03-16 NOTE — PROGRESS NOTES
Oculoplastic Clinic New Patient    Patient: Delonte Tijerina MRN# [id number]   YOB: 1955 Age: 66 year old   Date of Visit: Mar 16, 2022    CC: Droopy eyelids obstructing vision.              HPI:     Chief Complaint(s) and History of Present Illness(es)     Consult For     Laterality: both eyes    Onset: 18 months ago    Course: gradually worsening    Associated symptoms: eye pain and dryness.  Negative for redness and   tearing    Treatments tried: artificial tears and eye drops    Pain scale: 0/10           Comments     Patient was referred for a ptosis evaluation from Dr Linder and Dr. Mello.  Eighteen months ago he had a surgery to repair his left retina and another for glaucoma in his left eye.  Since then his left upper lid has drooped.  He has trouble reading and is aware of peripheral vision loss due to the   current lid position.      iLzzy Duncan, COT 2:21 PM  March 16, 2022      Delonte Tijerina is a 66 year old male who has noted onset of droopy eyelids over the year. He noticed right after one of his eye surgeries but cannot recall if it was the glaucoma surgery or the cataract surgery. The droopy eyelid is interfering with activities of daily living including driving, and reading. The patient denies double vision, variability of the eyelid position, or dry eye symptoms.     POHx:     Additional Ocular History:   Trabeculectomy, left eye 5/2019. His IOP max prior to his left trabeculectomy was 44 in the left eye.  Cataract surgery, left eye 3/2021 ([facility])  He does not recall any injury to his right eye.   Central serous retinopathy left eye.     EXAM:     MRD1: 1 mm right eye and -1 mm left eye   Dermatochalasis with excess skin touching eyelashes and aponeurotic ptosis     VISUAL FIELD:  Right eye untaped:20 degrees Right eye taped:50 degrees  Left eye untaped:10 degrees Left eye taped:40 degrees    Assessment & Plan     Delonte Tijerina is a 66 year old male with the  following diagnoses:   1. Ptosis, left eyelid    2. Involutional ptosis, acquired, right    3. Chronic iritis, left eye    4. Steroid responder, left eye    5. Central serous chorioretinopathy of left eye       Left ptosis repair external approach - minimal ellipse of skin, match right eye.     Did discuss he has ptosis and dermatochalais both eyes but not symptomatic right eye.         ANTICOAGULATION:    Aspirin - cardiac stents. Will ask his primary care physician if can hold.          PHOTOS DEMONSTRATE:    Blepharoptosis    Attending Physician Attestation: Complete documentation of historical and exam elements from today's encounter can be found in the full encounter summary report (not reduplicated in this progress note). I personally obtained the chief complaint(s) and history of present illness. I confirmed and edited as necessary the review of systems, past medical/surgical history, family history, social history, and examination findings as documented by others; and I examined the patient myself. I personally reviewed the relevant tests, images, and reports as documented above. I formulated and edited as necessary the assessment and plan and discussed the findings and management plan with the patient. Nils Arias MD      Today with Delonte Tijerina I reviewed the indications, risks, benefits, and alternatives of the proposed surgical procedure including, but not limited to, failure obtain the desired result  and need for additional surgery, bleeding, infection, loss of vision, loss of the eye, and the remote possibility of permanent damage to any organ system or death with the use of anesthesia.  I provided multiple opportunities for the questions, answered all questions to the best of my ability, and confirmed that my answers and my discussion were understood.

## 2022-03-16 NOTE — LETTER
3/16/2022         RE:  :  MRN: Delonte Tijerina  1955  [id number]     Dear Dr. Mello,    Thank you for asking me to see your patient, Delonte Tijerina, for an oculoplastic   consultation.  My assessment and plan are below.  For further details, please see my attached clinic note.           HPI:     Chief Complaint(s) and History of Present Illness(es)     Consult For     Laterality: both eyes    Onset: 18 months ago    Course: gradually worsening    Associated symptoms: eye pain and dryness.  Negative for redness and   tearing    Treatments tried: artificial tears and eye drops    Pain scale: 0/10           Comments     Patient was referred for a ptosis evaluation from Dr Linder and Dr. Mello.  Eighteen months ago he had a surgery to repair his left retina and another for glaucoma in his left eye.  Since then his left upper lid has drooped.  He has trouble reading and is aware of peripheral vision loss due to the   current lid position.      Lizzy Duncan, COT 2:21 PM  2022      Delonte Tijerina is a 66 year old male who has noted onset of droopy eyelids over the year. He noticed right after one of his eye surgeries but cannot recall if it was the glaucoma surgery or the cataract surgery. The droopy eyelid is interfering with activities of daily living including driving, and reading. The patient denies double vision, variability of the eyelid position, or dry eye symptoms.     POHx:     Additional Ocular History:   Trabeculectomy, left eye 2019. His IOP max prior to his left trabeculectomy was 44 in the left eye.  Cataract surgery, left eye 3/2021 ([facility])  He does not recall any injury to his right eye.   Central serous retinopathy left eye.     EXAM:     MRD1: 1 mm right eye and -1 mm left eye   Dermatochalasis with excess skin touching eyelashes and aponeurotic ptosis     VISUAL FIELD:  Right eye untaped:20 degrees Right eye taped:50 degrees  Left eye untaped:10 degrees Left eye  taped:40 degrees    Assessment & Plan     Delonte Tijerina is a 66 year old male with the following diagnoses:   1. Ptosis, left eyelid    2. Involutional ptosis, acquired, right    3. Chronic iritis, left eye    4. Steroid responder, left eye    5. Central serous chorioretinopathy of left eye       Left ptosis repair external approach - minimal ellipse of skin, match right eye.     Did discuss he has ptosis and dermatochalais both eyes but not symptomatic right eye.         ANTICOAGULATION:    Aspirin - cardiac stents. Will ask his primary care physician if can hold.         Again, thank you for allowing me to participate in the care of your patient.      Sincerely,    Nils Arias MD  Department of Ophthalmology and Visual Neurosciences  [facility]    CC: Delonte Tijerina  [address]

## 2022-03-23 ENCOUNTER — TELEPHONE (OUTPATIENT)
Dept: CARDIOLOGY | Facility: CLINIC | Age: 67
End: 2022-03-23
Payer: COMMERCIAL

## 2022-04-13 ENCOUNTER — OFFICE VISIT (OUTPATIENT)
Dept: OPHTHALMOLOGY | Facility: CLINIC | Age: 67
End: 2022-04-13
Attending: OPHTHALMOLOGY
Payer: COMMERCIAL

## 2022-04-13 DIAGNOSIS — H21.02 HYPHEMA OF LEFT EYE: ICD-10-CM

## 2022-04-13 DIAGNOSIS — H20.12 CHRONIC IRITIS, LEFT EYE: Primary | ICD-10-CM

## 2022-04-13 DIAGNOSIS — H40.042 STEROID RESPONDER, LEFT EYE: ICD-10-CM

## 2022-04-13 PROCEDURE — 99213 OFFICE O/P EST LOW 20 MIN: CPT | Performed by: OPHTHALMOLOGY

## 2022-04-13 PROCEDURE — G0463 HOSPITAL OUTPT CLINIC VISIT: HCPCS

## 2022-04-13 ASSESSMENT — TONOMETRY
IOP_METHOD: APPLANATION
OD_IOP_MMHG: 15
OS_IOP_MMHG: 11

## 2022-04-13 ASSESSMENT — VISUAL ACUITY
OD_SC: 20/25
METHOD: SNELLEN - LINEAR
OS_SC+: +2
OS_SC: 20/50
OD_SC+: -2
OS_PH_SC: 20/30

## 2022-04-13 ASSESSMENT — CONF VISUAL FIELD
METHOD: COUNTING FINGERS
OD_NORMAL: 1
OS_NORMAL: 1

## 2022-04-13 ASSESSMENT — CUP TO DISC RATIO
OD_RATIO: 0.5
OS_RATIO: 0.6

## 2022-04-13 ASSESSMENT — SLIT LAMP EXAM - LIDS
COMMENTS: PTOSIS
COMMENTS: PTOSIS

## 2022-04-13 ASSESSMENT — EXTERNAL EXAM - RIGHT EYE: OD_EXAM: NORMAL

## 2022-04-13 ASSESSMENT — EXTERNAL EXAM - LEFT EYE: OS_EXAM: NORMAL

## 2022-04-13 NOTE — PATIENT INSTRUCTIONS
Continue these medications: Combigan 2x/day each eye; latanoprost 1x/day at bedtime each eye, Dorzolamide 3x/day each eye  For the Valtrex, reduce to 1 pill (1000 mg)   For the steroid drop, let's continue 3x/day in the left eye until 4/20/22, then reduce to 2x/day until next visit. Okay to take oral antibiotic and okay to use any eyelid medications with Dr. Martinez

## 2022-04-13 NOTE — LETTER
4/13/2022       RE: Delonte Tijerina  [address]  [facility] [address]     Dear Colleague,    Thank you for referring your patient, Delonte Tijerina, to the [facility] [place] at [facility]. Please see a copy of my visit note below.    Chief Complaint/Presenting Concern: Uveitis follow-up    Interval History of Present Ocular Illness:  Delonte Tijerina is a 66 year old patient who returns for follow up of his chronic iritis of the left eye.  He was last seen on March 9, 2022 at which time the uveitis was active and a small microhyphema was visible by gonioscopy.  Eye pressure was 20.  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  We recommended increasing the prednisolone drops to 3 times a day in the left eye with continued use of Combigan twice a day and latanoprost in the evening in the both eyes and added Valtrex 1000 mg daily along with dorzolamide 3 times a day in both eyes.    Mr. Tijerina saw Dr. Arias on 3/14/22. They discussed left upper eyelid surgery.     Mr. Tijerina reports the eye feels about the same. Had some flashing lights which went away    Interval Updates to Medical/Family/Social History:    Had ER visit for unrelated issues on 3/13/22.    Relevant Review of Systems Updates:  No stomach issues on Valtrex.     Laboratory Testing: 3/18/22: CMP WNL Other than elevated creatinine 1.67 and elevated glucose 107     Current eye related medications: Combigan 2x/day each eye; latanoprost 1x/day at bedtime each eye, Dorzolamide 3x/day each eye, prednisolone 3x/day left eye, Valtrex 1000 mg three times daily    Retina/Uveitis Imaging: None today    Assessment:      1. Chronic iritis, left eye  Improved on Valtrex with Prednisolone    2. Hyphema of left eye  Resolved    3. Steroid responder, left eye  IOP controlled     Plan/Recommendations:      Discussed findings with patient. The  inflammation has improved in the left eye. Although hyphema gone and no obvious angle signs of viral uveitis, we will continue prophylactic valtrex as we taper steroid drops    Eye pressure is 15,11 (Improved). So we can continue drops for eye pressure lowering    Recommend additional testing: None at this time    Continue these medications: Combigan 2x/day each eye; latanoprost 1x/day at bedtime each eye, Dorzolamide 3x/day each eye    For the Valtrex, reduce to 1 pill (1000 mg)     For the steroid drop, let's continue 3x/day in the left eye until 4/20/22, then reduce to 2x/day until next visit. Okay to take oral antibiotic and okay to use any eyelid medications with Dr. Martinez     No other medications needed at this time.     RTC Late May-early June, applanate, no dilation, no testing    Physician Attestation     Attending Physician Attestation:  Complete documentation of historical and exam elements from today's encounter can be found in the full encounter summary report (not reduplicated in this progress note). I personally obtained the chief complaint(s) and history of present illness. I confirmed and edited as necessary the review of systems, past medical/surgical history, family history, social history, and examination findings as documented by others; and I examined the patient myself. I personally reviewed the relevant tests, images, and reports as documented above. I formulated and edited as necessary the assessment and plan and discussed the findings and management plan with the patient and family members present at the time of this visit.  Syd Linder M.D., Uveitis and Medical Retina, April 13, 2022     Sincerely,    Syd Linder MD  [facility] Dept of Ophthalmology  Uveitis and Medical Retina

## 2022-04-13 NOTE — H&P (VIEW-ONLY)
Chief Complaint/Presenting Concern: Uveitis follow-up    Interval History of Present Ocular Illness:  Delonte Tijerina is a 66 year old patient who returns for follow up of his chronic iritis of the left eye.  He was last seen on March 9, 2022 at which time the uveitis was active and a small microhyphema was visible by gonioscopy.  Eye pressure was 20.  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  We recommended increasing the prednisolone drops to 3 times a day in the left eye with continued use of Combigan twice a day and latanoprost in the evening in the both eyes and added Valtrex 1000 mg daily along with dorzolamide 3 times a day in both eyes.    Mr. Tijerina saw Dr. Arias on 3/14/22. They discussed left upper eyelid surgery.     Mr. Tijerina reports the eye feels about the same. Had some flashing lights which went away    Interval Updates to Medical/Family/Social History:    Had ER visit for unrelated issues on 3/13/22.    Relevant Review of Systems Updates:  No stomach issues on Valtrex.     Laboratory Testing: 3/18/22: CMP WNL Other than elevated creatinine 1.67 and elevated glucose 107     Current eye related medications: Combigan 2x/day each eye; latanoprost 1x/day at bedtime each eye, Dorzolamide 3x/day each eye, prednisolone 3x/day left eye, Valtrex 1000 mg three times daily    Retina/Uveitis Imaging: None today    Assessment:      1. Chronic iritis, left eye  Improved on Valtrex with Prednisolone    2. Hyphema of left eye  Resolved    3. Steroid responder, left eye  IOP controlled     Plan/Recommendations:      Discussed findings with patient. The inflammation has improved in the left eye. Although hyphema gone and no obvious angle signs of viral uveitis, we will continue prophylactic valtrex as we taper steroid drops    Eye pressure is 15,11 (Improved). So we can continue drops for eye pressure lowering    Recommend additional testing: None at this  time    Continue these medications: Combigan 2x/day each eye; latanoprost 1x/day at bedtime each eye, Dorzolamide 3x/day each eye    For the Valtrex, reduce to 1 pill (1000 mg)     For the steroid drop, let's continue 3x/day in the left eye until 4/20/22, then reduce to 2x/day until next visit. Okay to take oral antibiotic and okay to use any eyelid medications with Dr. Martinez     No other medications needed at this time.     RTC Late May-early June, applanate, no dilation, no testing    Physician Attestation     Attending Physician Attestation:  Complete documentation of historical and exam elements from today's encounter can be found in the full encounter summary report (not reduplicated in this progress note). I personally obtained the chief complaint(s) and history of present illness. I confirmed and edited as necessary the review of systems, past medical/surgical history, family history, social history, and examination findings as documented by others; and I examined the patient myself. I personally reviewed the relevant tests, images, and reports as documented above. I formulated and edited as necessary the assessment and plan and discussed the findings and management plan with the patient and family members present at the time of this visit.  Syd Linder M.D., Uveitis and Medical Retina, April 13, 2022

## 2022-04-13 NOTE — NURSING NOTE
Chief Complaints and History of Present Illnesses   Patient presents with     Iritis Follow Up     Chief Complaint(s) and History of Present Illness(es)     Iritis Follow Up     Laterality: left eye    Onset: gradual    Onset: months ago    Quality: States va is the same since last visit      Associated symptoms: floaters (about a week ago in the OS but has gone away).  Negative for dryness, redness, tearing, photophobia and flashes    Treatments tried: eye drops    Pain scale: 0/10              Comments     Here for Chronic iritis, left eye   Combigan 2x/day in each eye   Latanoprost in each eye at night  prednisolone 3x/day in the left eye   Dorzolamide  3x/day each eye   Roya Koehler COT 1:37 PM April 13, 2022

## 2022-04-19 LAB
ALT SERPL-CCNC: 37 U/L (ref 0–55)
AST SERPL-CCNC: 30 U/L (ref 10–40)
CHOLESTEROL (EXTERNAL): 105 MG/DL (ref 0–199)
CREATININE (EXTERNAL): 1.32 MG/DL (ref 0.73–1.18)
GFR ESTIMATED (EXTERNAL): 59 ML/MIN/1.73M2
GLUCOSE (EXTERNAL): 89 MG/DL (ref 70–100)
HDLC SERPL-MCNC: 32 MG/DL
LDL CHOLESTEROL DIRECT (EXTERNAL): 52 MG/DL
NON HDL CHOLESTEROL (EXTERNAL): 73 MG/DL
POTASSIUM (EXTERNAL): 3.7 MMOL/L (ref 3.5–5.1)

## 2022-04-22 ENCOUNTER — ANESTHESIA EVENT (OUTPATIENT)
Dept: SURGERY | Facility: AMBULATORY SURGERY CENTER | Age: 67
End: 2022-04-22
Payer: COMMERCIAL

## 2022-04-25 ENCOUNTER — TELEPHONE (OUTPATIENT)
Dept: OPHTHALMOLOGY | Facility: CLINIC | Age: 67
End: 2022-04-25

## 2022-04-25 ENCOUNTER — HOSPITAL ENCOUNTER (OUTPATIENT)
Facility: AMBULATORY SURGERY CENTER | Age: 67
Discharge: HOME | End: 2022-04-25
Attending: OPHTHALMOLOGY | Admitting: OPHTHALMOLOGY
Payer: COMMERCIAL

## 2022-04-25 ENCOUNTER — ANESTHESIA (OUTPATIENT)
Dept: SURGERY | Facility: AMBULATORY SURGERY CENTER | Age: 67
End: 2022-04-25
Payer: COMMERCIAL

## 2022-04-25 ENCOUNTER — TRANSFERRED RECORDS (OUTPATIENT)
Dept: MULTI SPECIALTY CLINIC | Facility: CLINIC | Age: 67
End: 2022-04-25

## 2022-04-25 VITALS
TEMPERATURE: 97.4 F | DIASTOLIC BLOOD PRESSURE: 81 MMHG | HEIGHT: 70 IN | OXYGEN SATURATION: 97 % | RESPIRATION RATE: 16 BRPM | BODY MASS INDEX: 26.29 KG/M2 | WEIGHT: 183.64 LBS | SYSTOLIC BLOOD PRESSURE: 127 MMHG

## 2022-04-25 DIAGNOSIS — H20.12 CHRONIC IRITIS, LEFT EYE: ICD-10-CM

## 2022-04-25 DIAGNOSIS — H35.712 CENTRAL SEROUS CHORIORETINOPATHY OF LEFT EYE: Primary | ICD-10-CM

## 2022-04-25 DIAGNOSIS — H02.402 PTOSIS, LEFT EYELID: ICD-10-CM

## 2022-04-25 PROCEDURE — G8907 PT DOC NO EVENTS ON DISCHARG: HCPCS

## 2022-04-25 PROCEDURE — 67904 REPAIR EYELID DEFECT: CPT | Mod: E1

## 2022-04-25 PROCEDURE — G8918 PT W/O PREOP ORDER IV AB PRO: HCPCS

## 2022-04-25 PROCEDURE — 67904 REPAIR EYELID DEFECT: CPT | Mod: LT | Performed by: OPHTHALMOLOGY

## 2022-04-25 RX ORDER — FENTANYL CITRATE 50 UG/ML
25 INJECTION, SOLUTION INTRAMUSCULAR; INTRAVENOUS EVERY 5 MIN PRN
Status: DISCONTINUED | OUTPATIENT
Start: 2022-04-25 | End: 2022-04-26 | Stop reason: HOSPADM

## 2022-04-25 RX ORDER — ERYTHROMYCIN 5 MG/G
OINTMENT OPHTHALMIC
Qty: 3.5 G | Refills: 0 | Status: SHIPPED | OUTPATIENT
Start: 2022-04-25 | End: 2022-06-28

## 2022-04-25 RX ORDER — SODIUM CHLORIDE, SODIUM LACTATE, POTASSIUM CHLORIDE, CALCIUM CHLORIDE 600; 310; 30; 20 MG/100ML; MG/100ML; MG/100ML; MG/100ML
INJECTION, SOLUTION INTRAVENOUS CONTINUOUS
Status: DISCONTINUED | OUTPATIENT
Start: 2022-04-25 | End: 2022-04-26 | Stop reason: HOSPADM

## 2022-04-25 RX ORDER — ERYTHROMYCIN 5 MG/G
OINTMENT OPHTHALMIC PRN
Status: DISCONTINUED | OUTPATIENT
Start: 2022-04-25 | End: 2022-04-25 | Stop reason: HOSPADM

## 2022-04-25 RX ORDER — ONDANSETRON 2 MG/ML
4 INJECTION INTRAMUSCULAR; INTRAVENOUS EVERY 30 MIN PRN
Status: DISCONTINUED | OUTPATIENT
Start: 2022-04-25 | End: 2022-04-26 | Stop reason: HOSPADM

## 2022-04-25 RX ORDER — FENTANYL CITRATE 0.05 MG/ML
INJECTION, SOLUTION INTRAMUSCULAR; INTRAVENOUS PRN
Status: DISCONTINUED | OUTPATIENT
Start: 2022-04-25 | End: 2022-04-25

## 2022-04-25 RX ORDER — PROPOFOL 10 MG/ML
INJECTION, EMULSION INTRAVENOUS PRN
Status: DISCONTINUED | OUTPATIENT
Start: 2022-04-25 | End: 2022-04-25

## 2022-04-25 RX ORDER — METOPROLOL TARTRATE 1 MG/ML
1-2 INJECTION, SOLUTION INTRAVENOUS EVERY 5 MIN PRN
Status: DISCONTINUED | OUTPATIENT
Start: 2022-04-25 | End: 2022-04-26 | Stop reason: HOSPADM

## 2022-04-25 RX ORDER — TETRACAINE HYDROCHLORIDE 5 MG/ML
SOLUTION OPHTHALMIC PRN
Status: DISCONTINUED | OUTPATIENT
Start: 2022-04-25 | End: 2022-04-25 | Stop reason: HOSPADM

## 2022-04-25 RX ORDER — ONDANSETRON 4 MG/1
4 TABLET, ORALLY DISINTEGRATING ORAL EVERY 30 MIN PRN
Status: DISCONTINUED | OUTPATIENT
Start: 2022-04-25 | End: 2022-04-26 | Stop reason: HOSPADM

## 2022-04-25 RX ORDER — ACETAMINOPHEN 325 MG/1
975 TABLET ORAL ONCE
Status: COMPLETED | OUTPATIENT
Start: 2022-04-25 | End: 2022-04-25

## 2022-04-25 RX ORDER — OXYCODONE HYDROCHLORIDE 5 MG/1
5 TABLET ORAL EVERY 4 HOURS PRN
Status: DISCONTINUED | OUTPATIENT
Start: 2022-04-25 | End: 2022-04-26 | Stop reason: HOSPADM

## 2022-04-25 RX ORDER — FENTANYL CITRATE 50 UG/ML
25 INJECTION, SOLUTION INTRAMUSCULAR; INTRAVENOUS
Status: DISCONTINUED | OUTPATIENT
Start: 2022-04-25 | End: 2022-04-26 | Stop reason: HOSPADM

## 2022-04-25 RX ORDER — MEPERIDINE HYDROCHLORIDE 25 MG/ML
12.5 INJECTION INTRAMUSCULAR; INTRAVENOUS; SUBCUTANEOUS
Status: DISCONTINUED | OUTPATIENT
Start: 2022-04-25 | End: 2022-04-26 | Stop reason: HOSPADM

## 2022-04-25 RX ORDER — LIDOCAINE HYDROCHLORIDE 20 MG/ML
INJECTION, SOLUTION INFILTRATION; PERINEURAL PRN
Status: DISCONTINUED | OUTPATIENT
Start: 2022-04-25 | End: 2022-04-25

## 2022-04-25 RX ORDER — LIDOCAINE 40 MG/G
CREAM TOPICAL
Status: DISCONTINUED | OUTPATIENT
Start: 2022-04-25 | End: 2022-04-26 | Stop reason: HOSPADM

## 2022-04-25 RX ADMIN — FENTANYL CITRATE 25 MCG: 0.05 INJECTION, SOLUTION INTRAMUSCULAR; INTRAVENOUS at 07:33

## 2022-04-25 RX ADMIN — PROPOFOL 100 MG: 10 INJECTION, EMULSION INTRAVENOUS at 07:35

## 2022-04-25 RX ADMIN — LIDOCAINE HYDROCHLORIDE 60 MG: 20 INJECTION, SOLUTION INFILTRATION; PERINEURAL at 07:35

## 2022-04-25 RX ADMIN — SODIUM CHLORIDE, SODIUM LACTATE, POTASSIUM CHLORIDE, CALCIUM CHLORIDE: 600; 310; 30; 20 INJECTION, SOLUTION INTRAVENOUS at 06:41

## 2022-04-25 RX ADMIN — ACETAMINOPHEN 975 MG: 325 TABLET ORAL at 06:23

## 2022-04-25 NOTE — ANESTHESIA CARE TRANSFER NOTE
Patient: Delonte Tijerina    Procedure: Procedure(s):  REPAIR, PTOSIS       Diagnosis: Ptosis, left eyelid [H02.402]  Diagnosis Additional Information: No value filed.    Anesthesia Type:   MAC     Note:    Oropharynx: oropharynx clear of all foreign objects  Level of Consciousness: awake  Oxygen Supplementation: room air    Independent Airway: airway patency satisfactory and stable  Dentition: dentition unchanged  Vital Signs Stable: post-procedure vital signs reviewed and stable  Report to RN Given: handoff report given  Patient transferred to: Phase II  Comments: To Phase II. Report to RN.  VSS Resp status stable.  Handoff Report: Identifed the Patient, Identified the Reponsible Provider, Reviewed the pertinent medical history, Discussed the surgical course, Reviewed Intra-OP anesthesia mangement and issues during anesthesia, Set expectations for post-procedure period and Allowed opportunity for questions and acknowledgement of understanding      Vitals:  Vitals Value Taken Time   BP     Temp     Pulse     Resp     SpO2         Electronically Signed By: MYRTLE Obando CRNA  April 25, 2022  8:01 AM

## 2022-04-25 NOTE — TELEPHONE ENCOUNTER
[facility] Call Center    Phone Message    May a detailed message be left on voicemail: yes     Reason for Call: Medication Question or concern regarding medication   Prescription Clarification  Name of Medication: erythromycin (ROMYCIN) 5 MG/GM ophthalmic ointment  Prescribing Provider: Nils   Pharmacy: [facility]   What on the order needs clarification? Pharmacy is questioning the dosage on this.  Please call them back to discuss.  Thanks.    Action Taken: Message routed to:  Adult Clinics: Eye p [id number]    Travel Screening: Not Applicable

## 2022-04-25 NOTE — INTERVAL H&P NOTE
"I have reviewed the surgical (or preoperative) H&P that is linked to this encounter, and examined the patient. There are no significant changes    Clinical Conditions Present on Arrival:  Clinically Significant Risk Factors Present on Admission                  # Platelet Defect: home medication list includes an antiplatelet medication  # Overweight: Estimated body mass index is 26.35 kg/m  as calculated from the following:    Height as of this encounter: 1.778 m (5' 10\").    Weight as of this encounter: 83.3 kg (183 lb 10.3 oz).       "

## 2022-04-25 NOTE — OP NOTE
PREOPERATIVE DIAGNOSIS: Ptosis, left upper lid.   POSTOPERATIVE DIAGNOSIS:  Ptosis,left upper lid.   PROCEDURE:Left  upper eyelid  ptosis repair by external levator resection.   SURGEON: Nils Arias MD   ANESTHESIA: Monitored with local infiltration of a 50/50 mixture of 2% lidocaine with epinephrine and 0.5% Marcaine.   COMPLICATIONS: None.   ESTIMATED BLOOD LOSS: Less than 5 mL.   HISTORY: Delonte Tijerina  presented with ptosis of left upper lid interfering with the superior visual field and activities of daily living. This developed following cataract and glaucoma surgery. After the risks, benefits and alternatives to the proposed procedure were explained, informed consent was obtained.   DESCRIPTION OF PROCEDURE: Delonte Tijerina  was brought to the operating room and placed supine on the operating table. Intravenous sedation was given. The left upper lid crease was marked with a marking pen and infiltrated with local anesthetic. The area was prepped and draped in the typical sterile ophthalmic fashion. Attention was directed to the left  side. A lid crease incision was made with a 15 blade and dissection carried down to the orbicularis with high temperature cautery. The orbital septum was opened horizontally. An ellipse of  levator aponeurosis was excised and freed from the underlying Funes's muscle and advanced with a 6-0 vicryl suture. The patient was asked to open the eye and the suture adjusted for height and contour.  The skin was closed with with running 6-0 plain gut sutures.  Ophthalmic ointment was applied to the incision. The patient tolerated the procedure well and left the operating room in stable condition.     Nils Arias MD

## 2022-04-25 NOTE — DISCHARGE INSTRUCTIONS
Post-operative Instructions  Ophthalmic Plastic and Reconstructive Surgery    Nils Arias M.D.     All instructions apply to the operated eye(s) or eyelid(s).    Wound care and personal care  ? Apply ice compresses 15 minutes of every hour while awake for 2 days. As long as there is no further bleeding, after two days, switch to warm water compresses for five minutes, four times a day until seen by your physician.   ? You may shower or wash your hair the day after surgery. Do not go swimming for at least 2 weeks to prevent contamination of your wounds.  ? You may go for walks and light activity is ok, but no heavy (over 15 pounds) lifting, bending or excessive straining for one week.   ? Do not apply make-up to the eyes or eyelids for 2 weeks after surgery.  ? Expect some swelling, bruising, black eye (even into the lower eyelids and cheeks). Also expect serum caking, crusting and discharge from the eye and/or incisions. A small amount of surface bleeding, and depending on the type of surgery, bleeding from the inside of the eyelid, is normal for the first 48 hours.  ? Avoid straining, bending at the waist, or lifting more than 15 pounds for 1 week. Sleeping with your head elevated, such as in a recliner, for the first several days can help swelling resolve more quickly.   ? Do continue to ambulate (walk) as you normally would - being sedentary after surgery can cause blood clots.   ? Your eye(s) and eyelid(s) may be painful and tender. This is normal after surgery.      Contact information and follow-up  ? Return to the Eye Clinic for a follow-up appointment with your physician as scheduled. If no appointment has been scheduled:   - [facility] eye clinic: [phone] for an appointment with Dr. Arias within 2 to 3 weeks from your date of surgery.      -  [facility] eye clinic: [phone] for an appointment with Dr. Arias within 2 to 3 weeks from your date of surgery.  "    ? For severe pain, bleeding, or loss of vision, call the [facility] at [phone] or [facility] at [phone].     After hours or on weekends and holidays, call [phone] and ask to speak with the ophthalmologist on call.    An on call person can be reached after hours for concerns. The on call doctor should not call in medication refill requests after hours or on weekends, so please plan accordingly. An effort has been made to provide adequate pain medications following every surgery, and refills will not be provided in most instances.     Medications  ? In addition to your home medications, take the following post-operative medications as prescribed by your physician.    ? Apply antibiotic ointment to all sutures three times a day. If your eye is dry or not closing well, you can apply a 1/2\" strip into the operated eye(s) at night.    [facility]  Same-Day Surgery   Adult Discharge Orders & Instructions   For 24 hours after surgery  Get plenty of rest.  A responsible adult must stay with you for at least 24 hours after you leave the hospital.   Do not drive or use heavy equipment.  If you have weakness or tingling, don't drive or use heavy equipment until this feeling goes away.  Do not drink alcohol.  Avoid strenuous or risky activities.  Ask for help when climbing stairs.   You may feel lightheaded.  IF so, sit for a few minutes before standing.  Have someone help you get up.   If you have nausea (feel sick to your stomach): Drink only clear liquids such as apple juice, ginger ale, broth or 7-Up.  Rest may also help.  Be sure to drink enough fluids.  Move to a regular diet as you feel able.  You may have a slight fever. Call the doctor if your fever is over 100 F (37.7 C) (taken under the tongue) or lasts longer than 24 hours.  You may have a dry mouth, a sore throat, muscle aches or trouble sleeping.  These should go away after 24 " hours.  Do not make important or legal decisions.   Call your doctor for any of the followin.  Signs of infection (fever, growing tenderness at the surgery site, a large amount of drainage or bleeding, severe pain, foul-smelling drainage, redness, swelling)  To contact a doctor, call Dr Nils Arias's office at [phone]

## 2022-04-25 NOTE — ANESTHESIA PREPROCEDURE EVALUATION
Anesthesia Pre-Procedure Evaluation    Patient: Delonte Tijerina   MRN: [id number] : 1955        Procedure : Procedure(s):  REPAIR, PTOSIS          Past Medical History:   Diagnosis Date     Central serous chorioretinopathy of left eye      Chronic iritis, left eye      Steroid responder, left eye       Past Surgical History:   Procedure Laterality Date     GLAUCOMA SURGERY       RETINAL REATTACHMENT        No Known Allergies   Social History     Tobacco Use     Smoking status: Never Smoker     Smokeless tobacco: Never Used   Substance Use Topics     Alcohol use: Never      Wt Readings from Last 1 Encounters:   22 83.3 kg (183 lb 10.3 oz)        Anesthesia Evaluation   Pt has not had prior anesthetic         ROS/MED HX  ENT/Pulmonary:  - neg pulmonary ROS     Neurologic:  - neg neurologic ROS     Cardiovascular:     (+) Dyslipidemia hypertension--CAD -past MI -stent-    METS/Exercise Tolerance:     Hematologic:  - neg hematologic  ROS     Musculoskeletal:  - neg musculoskeletal ROS     GI/Hepatic:  - neg GI/hepatic ROS     Renal/Genitourinary:  - neg Renal ROS     Endo:  - neg endo ROS     Psychiatric/Substance Use:  - neg psychiatric ROS     Infectious Disease:  - neg infectious disease ROS     Malignancy:       Other:            Physical Exam    Airway  airway exam normal           Respiratory Devices and Support         Dental  no notable dental history     (+) partials      Cardiovascular   cardiovascular exam normal          Pulmonary   pulmonary exam normal                OUTSIDE LABS:  CBC: No results found for: WBC, HGB, HCT, PLT  BMP:   Lab Results   Component Value Date     2020    POTASSIUM 3.4 2020    CHLORIDE 106 2020    CO2 32 2020    BUN 17 2020    CR 1.37 (H) 2020     (H) 2020     COAGS: No results found for: PTT, INR, FIBR  POC: No results found for: BGM, HCG, HCGS  HEPATIC:   Lab Results   Component Value Date    ALBUMIN 3.6  08/06/2020    PROTTOTAL 7.4 08/06/2020    ALT 43 08/06/2020    AST 31 08/06/2020    ALKPHOS 168 (H) 08/06/2020    BILITOTAL 0.6 08/06/2020     OTHER:   Lab Results   Component Value Date    PEDRO 8.6 08/06/2020       Anesthesia Plan    ASA Status:  3   NPO Status:  NPO Appropriate    Anesthesia Type: MAC.     - Reason for MAC: chronic cardiopulmonary disease   Induction: Intravenous, Propofol.   Maintenance: TIVA.        Consents    Anesthesia Plan(s) and associated risks, benefits, and realistic alternatives discussed. Questions answered and patient/representative(s) expressed understanding.    - Discussed:     - Discussed with:  Patient, Legal Guardian      - Extended Intubation/Ventilatory Support Discussed: No.      - Patient is DNR/DNI Status: No    Use of blood products discussed: No .     Postoperative Care    Pain management: IV analgesics, Oral pain medications.   PONV prophylaxis: Ondansetron (or other 5HT-3), Background Propofol Infusion     Comments:           H&P reviewed: Unable to attach H&P to encounter due to EHR limitations. H&P Update: appropriate H&P reviewed, patient examined. No interval changes since H&P (within 30 days).         Dany Oliveros MD

## 2022-04-25 NOTE — ANESTHESIA POSTPROCEDURE EVALUATION
Patient: Delonte Tijerina    Procedure: Procedure(s):  REPAIR, PTOSIS       Anesthesia Type:  MAC    Note:  Disposition: Outpatient   Postop Pain Control: Uneventful            Sign Out: Well controlled pain   PONV: No   Neuro/Psych: Uneventful            Sign Out: Acceptable/Baseline neuro status   Airway/Respiratory: Uneventful            Sign Out: Acceptable/Baseline resp. status   CV/Hemodynamics: Uneventful            Sign Out: Acceptable CV status; No obvious hypovolemia; No obvious fluid overload   Other NRE: NONE   DID A NON-ROUTINE EVENT OCCUR? No           Last vitals:  Vitals Value Taken Time   /81 04/25/22 0820   Temp 97.4  F (36.3  C) 04/25/22 0820   Pulse     Resp 16 04/25/22 0820   SpO2 97 % 04/25/22 0820       Electronically Signed By: Dany Oliveros MD  April 25, 2022  9:54 AM

## 2022-04-30 DIAGNOSIS — I21.4 NSTEMI (NON-ST ELEVATED MYOCARDIAL INFARCTION) (H): ICD-10-CM

## 2022-05-03 RX ORDER — AMLODIPINE BESYLATE 10 MG/1
10 TABLET ORAL AT BEDTIME
Qty: 60 TABLET | Refills: 0 | OUTPATIENT
Start: 2022-05-03

## 2022-05-03 NOTE — TELEPHONE ENCOUNTER
amLODIPine (NORVASC) 10 MG     Last Written Prescription Date:  1/14/22  Last Fill Quantity: 60,   # refills: 1  Last Office Visit : 8/6/20  Future Office visit:  NONE  RTC 1 YEAR   Routing refill request to provider for review/approval because:  30 DAY RF PENDING   * OVER DUE RTC > 18 MOS

## 2022-05-27 ENCOUNTER — TELEPHONE (OUTPATIENT)
Dept: OPHTHALMOLOGY | Facility: CLINIC | Age: 67
End: 2022-05-27
Payer: COMMERCIAL

## 2022-05-27 NOTE — TELEPHONE ENCOUNTER
Called and LM asking to reschedule the appointment on 5/31 to another day with Dr Linder.  I left the call centers phone number.

## 2022-06-05 ENCOUNTER — HEALTH MAINTENANCE LETTER (OUTPATIENT)
Age: 67
End: 2022-06-05

## 2022-06-24 ENCOUNTER — TELEPHONE (OUTPATIENT)
Dept: OPHTHALMOLOGY | Facility: CLINIC | Age: 67
End: 2022-06-24

## 2022-06-24 ENCOUNTER — OFFICE VISIT (OUTPATIENT)
Dept: OPHTHALMOLOGY | Facility: CLINIC | Age: 67
End: 2022-06-24
Attending: OPHTHALMOLOGY
Payer: COMMERCIAL

## 2022-06-24 DIAGNOSIS — Z53.9 ERRONEOUS ENCOUNTER--DISREGARD: Primary | ICD-10-CM

## 2022-06-24 PROCEDURE — 99207 PR NO CHG PAT LEFT NOT BEING SEEN: CPT | Performed by: OPHTHALMOLOGY

## 2022-06-24 PROCEDURE — G0463 HOSPITAL OUTPT CLINIC VISIT: HCPCS

## 2022-06-24 ASSESSMENT — VISUAL ACUITY
OS_CC: 20/30
OS_CC+: -1
OD_CC: 20/25
OD_CC+: -1
METHOD: SNELLEN - LINEAR
CORRECTION_TYPE: GLASSES

## 2022-06-24 ASSESSMENT — CONF VISUAL FIELD
METHOD: COUNTING FINGERS
OD_NORMAL: 1
OS_NORMAL: 1

## 2022-06-24 ASSESSMENT — TONOMETRY
OS_IOP_MMHG: 7
OD_IOP_MMHG: 16
IOP_METHOD: APPLANATION

## 2022-06-24 NOTE — NURSING NOTE
Chief Complaints and History of Present Illnesses   Patient presents with     Iritis Follow Up     Chief Complaint(s) and History of Present Illness(es)     Iritis Follow Up     Onset: gradual    Onset: months ago    Associated symptoms: eye pain.  Negative for photophobia, flashes and floaters    Treatments tried: eye drops              Comments     Here for chronic iritis left eye.    Combigan 2x/day in each eye   Latanoprost in each eye at night   prednisolone 3x/day in the left eye   Dorzolamide  3x/day each eye     Nikhil MARROQUIN 9:09 AM June 24, 2022

## 2022-06-24 NOTE — TELEPHONE ENCOUNTER
Pt left appointment today without being seen by Dr Linder.  I called and LM for Delonte to reschedule the appointment.   I left the call centers phone number.

## 2022-06-28 ENCOUNTER — OFFICE VISIT (OUTPATIENT)
Dept: OPHTHALMOLOGY | Facility: CLINIC | Age: 67
End: 2022-06-28
Attending: OPHTHALMOLOGY
Payer: COMMERCIAL

## 2022-06-28 DIAGNOSIS — H20.12 CHRONIC IRITIS, LEFT EYE: Primary | ICD-10-CM

## 2022-06-28 DIAGNOSIS — H35.712 CENTRAL SEROUS CHORIORETINOPATHY OF LEFT EYE: ICD-10-CM

## 2022-06-28 DIAGNOSIS — H40.042 STEROID RESPONDER, LEFT EYE: ICD-10-CM

## 2022-06-28 DIAGNOSIS — H21.02 HYPHEMA OF LEFT EYE: ICD-10-CM

## 2022-06-28 DIAGNOSIS — H40.051 OCULAR HYPERTENSION, RIGHT EYE: ICD-10-CM

## 2022-06-28 PROCEDURE — 99213 OFFICE O/P EST LOW 20 MIN: CPT | Mod: 24 | Performed by: OPHTHALMOLOGY

## 2022-06-28 PROCEDURE — G0463 HOSPITAL OUTPT CLINIC VISIT: HCPCS

## 2022-06-28 RX ORDER — BRIMONIDINE TARTRATE AND TIMOLOL MALEATE 2; 5 MG/ML; MG/ML
1 SOLUTION OPHTHALMIC 2 TIMES DAILY
Qty: 15 ML | Refills: 4 | Status: SHIPPED | OUTPATIENT
Start: 2022-06-28 | End: 2023-07-11

## 2022-06-28 RX ORDER — PREDNISOLONE ACETATE 10 MG/ML
1 SUSPENSION/ DROPS OPHTHALMIC 2 TIMES DAILY
Qty: 10 ML | Refills: 4 | Status: SHIPPED | OUTPATIENT
Start: 2022-06-28

## 2022-06-28 RX ORDER — VALACYCLOVIR HYDROCHLORIDE 1 G/1
1000 TABLET, FILM COATED ORAL DAILY
Qty: 90 TABLET | Refills: 1 | Status: SHIPPED | OUTPATIENT
Start: 2022-06-28

## 2022-06-28 RX ORDER — DORZOLAMIDE HCL 20 MG/ML
1 SOLUTION/ DROPS OPHTHALMIC 3 TIMES DAILY
Qty: 10 ML | Refills: 4 | Status: SHIPPED | OUTPATIENT
Start: 2022-06-28 | End: 2022-12-31

## 2022-06-28 ASSESSMENT — CONF VISUAL FIELD
OS_NORMAL: 1
METHOD: COUNTING FINGERS
OD_NORMAL: 1

## 2022-06-28 ASSESSMENT — CUP TO DISC RATIO
OS_RATIO: 0.6
OD_RATIO: 0.5

## 2022-06-28 ASSESSMENT — TONOMETRY
IOP_METHOD: APPLANATION
OS_IOP_MMHG: 10
OD_IOP_MMHG: 14

## 2022-06-28 ASSESSMENT — EXTERNAL EXAM - RIGHT EYE: OD_EXAM: NORMAL

## 2022-06-28 ASSESSMENT — VISUAL ACUITY
OS_SC: 20/40
METHOD: SNELLEN - LINEAR
OD_SC+: -2
OS_PH_SC: 20/25
OS_PH_SC+: -1
OD_SC: 20/25

## 2022-06-28 ASSESSMENT — SLIT LAMP EXAM - LIDS: COMMENTS: PTOSIS

## 2022-06-28 ASSESSMENT — EXTERNAL EXAM - LEFT EYE: OS_EXAM: NORMAL

## 2022-06-28 NOTE — PROGRESS NOTES
Chief Complaint/Presenting Concern:  Uveitis follow up    Interval History of Present Ocular Illness:  Delonte Tijerina is a 66 year old patient who returns for follow up of his chronic iritis of the left eye. At last visit, hyphema was improving. We recommended continued Valtrex and drops. Mr. Tijerina stopped the pill over one month ago when the prescription ran out and feels things are about the same even off the pill.    Mr. Tijerina saw Dr. Arias and he underwent left upper eyelid surgery. He feels things are about the same, not significantly improved    Interval Updates to Medical/Family/Social History:  No changes    Relevant Review of Systems Updates:  No stomach issues on Valtrex    Current eye related medications: Combigan 2x/day in each eye, Latanoprost in each eye at night, Dorzolamide 3x/day each eye, Prednisolone 3x/day in the left eye (off Valtrex for one month)    Retina/Uveitis Imaging: None today    Assessment:     1. Chronic iritis, left eye  Doing well even off Valtrex    2. Hyphema of left eye  No recurrence, no NVI    3. Steroid responder, left eye  IOP 10    4. Ocular hypertension, right eye  IOP 14    Plan/Recommendations:      Discussed findings with patient. The inflammation is stable even off Valtrex. We discussed trying to restart daily Valtrex to help us determine if we can reduce the frequency of steroid drops long term.     Eye pressure is 14 right eye and 10 left eye on drops. We can continue these    Recommend additional testing: None at this time    Continue these medications unchanged: Combigan 2x/day in each eye, Dorzolamide 3x/day in each eye and Latanoprost at bedtime each eye     Restart the Valtrex 1000 mg daily to help control inflammation from the inside. Keep taking this pill until next time.    For the steroid drop (Prednisolone with pink top), use 3x/day in the left eye for two more weeks. Then, reduce to 2x/day until next visit    No other changes recommended      RTC 3 months applanate, no dilation but with RNFL and OCT (ordered)    Physician Attestation     Attending Physician Attestation:  Complete documentation of historical and exam elements from today's encounter can be found in the full encounter summary report (not reduplicated in this progress note). I personally obtained the chief complaint(s) and history of present illness. I confirmed and edited as necessary the review of systems, past medical/surgical history, family history, social history, and examination findings as documented by others; and I examined the patient myself. I personally reviewed the relevant tests, images, and reports as documented above. I formulated and edited as necessary the assessment and plan and discussed the findings and management plan with the patient and family members present at the time of this visit.  Syd Linder M.D., Uveitis and Medical Retina, June 28, 2022

## 2022-06-28 NOTE — PATIENT INSTRUCTIONS
Continue these medications unchanged: Combigan 2x/day in each eye, Dorzolamide 3x/day in each eye and Latanoprost at bedtime each eye   Restart the Valtrex 1000 mg daily to help control inflammation from the inside. Keep taking this pill until next time.  For the steroid drop (Prednisolone with pink top), use 3x/day in the left eye for two more weeks. Then, reduce to 2x/day until next visit  No other changes recommended

## 2022-06-28 NOTE — NURSING NOTE
Chief Complaints and History of Present Illnesses   Patient presents with     Iritis Follow Up     Chief Complaint(s) and History of Present Illness(es)     Iritis Follow Up     Laterality: left eye    Onset: gradual    Severity: moderate    Frequency: intermittently              Comments     Patient states that his vision is the same since he was here last on 6/24/2022.   He states that he will get a pressure pain, and then when puts the eye drops in it will go away. No flashes of lights. No floaters.     Ocular Meds:Combigan 2x/day in each eye   Latanoprost in each eye at night   prednisolone 3x/day in the left eye   Dorzolamide  3x/day each eye     Maynor MARROQUIN, June 28, 2022 8:54 AM

## 2022-06-28 NOTE — LETTER
6/28/2022       RE: Delonte Tijerina  [address]  [facility] [address]     Dear Colleague,    Thank you for referring your patient, Delonte Tijerina, to the [facility] [place] at [facility]. Please see a copy of my visit note below.    Chief Complaint/Presenting Concern:  Uveitis follow up    Interval History of Present Ocular Illness:  Delonte Tijerina is a 66 year old patient who returns for follow up of his chronic iritis of the left eye. At last visit, hyphema was improving. We recommended continued Valtrex and drops. Mr. Tijerina stopped the pill over one month ago when the prescription ran out and feels things are about the same even off the pill.    Mr. Tijerina saw Dr. Arias and he underwent left upper eyelid surgery. He feels things are about the same, not significantly improved    Interval Updates to Medical/Family/Social History:  No changes    Relevant Review of Systems Updates:  No stomach issues on Valtrex    Current eye related medications: Combigan 2x/day in each eye, Latanoprost in each eye at night, Dorzolamide  3x/day each eye, Prednisolone 3x/day in the left eye (off Valtrex for one month)    Retina/Uveitis Imaging: None today    Assessment:     1. Chronic iritis, left eye  Doing well even off Valtrex      2. Hyphema of left eye  No recurrence, no NVI    3. Steroid responder, left eye  IOP 10    4. Ocular hypertension, right eye  IOP 14    Plan/Recommendations:      Discussed findings with patient. The inflammation is stable even off Valtrex. We discussed trying to restart daily Valtrex to help us determine if we can reduce the frequency of steroid drops long term.     Eye pressure is 14 right eye and 10 left eye on drops. We can continue these    Recommend additional testing: None at this time    Continue these medications unchanged: Combigan 2x/day in each eye, Dorzolamide 3x/day in each eye and Latanoprost at  bedtime each eye     Restart the Valtrex 1000 mg daily to help control inflammation from the inside. Keep taking this pill until next time.    For the steroid drop (Prednisolone with pink top), use 3x/day in the left eye for two more weeks. Then, reduce to 2x/day until next visit    No other changes recommended     RTC 3 months applanate, no dilation but with RNFL and OCT (ordered)    Physician Attestation     Attending Physician Attestation:  Complete documentation of historical and exam elements from today's encounter can be found in the full encounter summary report (not reduplicated in this progress note). I personally obtained the chief complaint(s) and history of present illness. I confirmed and edited as necessary the review of systems, past medical/surgical history, family history, social history, and examination findings as documented by others; and I examined the patient myself. I personally reviewed the relevant tests, images, and reports as documented above. I formulated and edited as necessary the assessment and plan and discussed the findings and management plan with the patient and family members present at the time of this visit.  Syd Linder M.D., Uveitis and Medical Retina, June 28, 2022     Sincerely,    Syd Linder MD  [facility] Dept of Ophthalmology  Uveitis and Medical Retina

## 2022-10-15 ENCOUNTER — HEALTH MAINTENANCE LETTER (OUTPATIENT)
Age: 67
End: 2022-10-15

## 2022-12-30 DIAGNOSIS — H20.12 CHRONIC IRITIS, LEFT EYE: ICD-10-CM

## 2022-12-31 RX ORDER — DORZOLAMIDE HCL 20 MG/ML
1 SOLUTION/ DROPS OPHTHALMIC 3 TIMES DAILY
Qty: 10 ML | Refills: 1 | Status: SHIPPED | OUTPATIENT
Start: 2022-12-31 | End: 2023-01-01

## 2022-12-31 NOTE — TELEPHONE ENCOUNTER
"  dorzolamide (TRUSOPT) 2 % ophthalmic solution   Last Written Prescription Date: 6/28/22  Last Fill Quantity: 10ml,   # refills: 4  Last Office Visit : 6/28/22  Future Office visit:  None      RTC 3 months    \" Continue   Dorzolamide 3x/day in each eye\"      "

## 2023-01-01 RX ORDER — DORZOLAMIDE HCL 20 MG/ML
1 SOLUTION/ DROPS OPHTHALMIC 3 TIMES DAILY
Qty: 10 ML | Refills: 1 | Status: SHIPPED | OUTPATIENT
Start: 2023-01-01

## 2023-06-14 DIAGNOSIS — H20.12 CHRONIC IRITIS, LEFT EYE: ICD-10-CM

## 2023-06-15 RX ORDER — VALACYCLOVIR HYDROCHLORIDE 1 G/1
1000 TABLET, FILM COATED ORAL DAILY
Qty: 90 TABLET | Status: CANCELLED | OUTPATIENT
Start: 2023-06-15

## 2023-06-15 NOTE — TELEPHONE ENCOUNTER
Tried calling home and mobile, but phone rings the silence     Unable to leave a VM     Mnoa Dennis Communication Facilitator on 6/15/2023 at 2:14 PM

## 2023-06-15 NOTE — TELEPHONE ENCOUNTER
Able to leave voicemessage on mobile nmAppia [phone]    Asked to call direct number to review future scheduling, review if seeing another eye provider and review ability to fill Rx until seen after appt made with provider.    Bobby Ferguson RN 3:13 PM 06/15/23

## 2023-06-15 NOTE — TELEPHONE ENCOUNTER
valACYclovir (VALTREX) 1000 mg tablet     Last Written Prescription Date:  06-  Last Fill Quantity: 90,   # refills: 1  Last Office Visit : 6-  Future Office visit:  Not onfile        Restart the Valtrex 1000 mg daily to help control inflammation from the inside. Keep taking this pill until next time.    Restart the Valtrex 1000 mg daily to help control inflammation from the inside. Keep taking this pill until next time.

## 2023-07-11 DIAGNOSIS — H40.042 STEROID RESPONDER, LEFT EYE: ICD-10-CM

## 2023-07-11 RX ORDER — BRIMONIDINE TARTRATE AND TIMOLOL MALEATE 2; 5 MG/ML; MG/ML
1 SOLUTION OPHTHALMIC 2 TIMES DAILY
Qty: 10 ML | Refills: 2 | Status: SHIPPED | OUTPATIENT
Start: 2023-07-11 | End: 2023-12-04

## 2023-07-11 NOTE — TELEPHONE ENCOUNTER
brimonidine-timolol (COMBIGAN) 0.2-0.5 % ophthalmic solution    Last Written Prescription Date:  06-  Last Fill Quantity: 15 ml,   # refills: 4  Last Office Visit : 6-  Future Office visit:  none

## 2023-08-20 ENCOUNTER — HEALTH MAINTENANCE LETTER (OUTPATIENT)
Age: 68
End: 2023-08-20

## 2023-12-03 DIAGNOSIS — H40.042 STEROID RESPONDER, LEFT EYE: ICD-10-CM

## 2023-12-04 RX ORDER — BRIMONIDINE TARTRATE AND TIMOLOL MALEATE 2; 5 MG/ML; MG/ML
1 SOLUTION OPHTHALMIC 2 TIMES DAILY
Qty: 2.5 ML | Refills: 0 | Status: SHIPPED | OUTPATIENT
Start: 2023-12-04

## 2023-12-04 NOTE — TELEPHONE ENCOUNTER
Called and LVM     Delonte needs a eye appointment with Dr. Linder before we can refill his medication     Mona Dennis Communication Facilitator on 12/4/2023 at 4:29 PM

## 2023-12-04 NOTE — TELEPHONE ENCOUNTER
brimonidine-timolol (COMBIGAN) 0.2-0.5 % ophthalmic solution 10 mL 2 7/11/2023     Last Office Visit: 6/28/22  Future Office visit:   none  (RTC 3 months applanate, no dilation but with RNFL and OCT (ordered) )    Routing refill request to provider for review:  LAST VISIT 6/2022  HISTORY OF CANCELLATION AND NO SHOW APPTS    Brii Vu RN

## 2024-10-13 ENCOUNTER — HEALTH MAINTENANCE LETTER (OUTPATIENT)
Age: 69
End: 2024-10-13

## (undated) DEVICE — NDL 30GA 0.5" 305106

## (undated) DEVICE — PACK MINOR EYE

## (undated) DEVICE — GLOVE PROTEXIS W/NEU-THERA 7.5  2D73TE75

## (undated) DEVICE — ESU EYE HIGH TEMP 65410-183

## (undated) DEVICE — ESU ELEC NDL 1" COATED/INSULATED E1465

## (undated) DEVICE — SOL WATER IRRIG 1000ML BOTTLE 07139-09

## (undated) DEVICE — MARKER SKIN DOUBLE TIP W/FLEXI-RULER W/LABELS

## (undated) DEVICE — SYR 03ML LL W/O NDL

## (undated) RX ORDER — PROPOFOL 10 MG/ML
INJECTION, EMULSION INTRAVENOUS
Status: DISPENSED
Start: 2022-04-25

## (undated) RX ORDER — ETHYL CHLORIDE 100 %
AEROSOL, SPRAY (ML) TOPICAL
Status: DISPENSED
Start: 2022-04-25

## (undated) RX ORDER — ACETAMINOPHEN 325 MG/1
TABLET ORAL
Status: DISPENSED
Start: 2022-04-25

## (undated) RX ORDER — FENTANYL CITRATE 50 UG/ML
INJECTION, SOLUTION INTRAMUSCULAR; INTRAVENOUS
Status: DISPENSED
Start: 2022-04-25

## (undated) RX ORDER — BUPIVACAINE HYDROCHLORIDE 5 MG/ML
INJECTION, SOLUTION PERINEURAL
Status: DISPENSED
Start: 2022-04-25

## (undated) RX ORDER — ERYTHROMYCIN 5 MG/G
OINTMENT OPHTHALMIC
Status: DISPENSED
Start: 2022-04-25

## (undated) RX ORDER — LIDOCAINE HYDROCHLORIDE 20 MG/ML
INJECTION, SOLUTION INFILTRATION; PERINEURAL
Status: DISPENSED
Start: 2022-04-25

## (undated) RX ORDER — TETRACAINE HYDROCHLORIDE 5 MG/ML
SOLUTION OPHTHALMIC
Status: DISPENSED
Start: 2022-04-25